# Patient Record
Sex: MALE | Race: WHITE | NOT HISPANIC OR LATINO | Employment: OTHER | URBAN - METROPOLITAN AREA
[De-identification: names, ages, dates, MRNs, and addresses within clinical notes are randomized per-mention and may not be internally consistent; named-entity substitution may affect disease eponyms.]

---

## 2017-01-05 ENCOUNTER — OFFICE VISIT (OUTPATIENT)
Dept: MEDICAL GROUP | Facility: PHYSICIAN GROUP | Age: 67
End: 2017-01-05
Payer: MEDICARE

## 2017-01-05 VITALS
HEART RATE: 62 BPM | SYSTOLIC BLOOD PRESSURE: 192 MMHG | WEIGHT: 194 LBS | BODY MASS INDEX: 24.9 KG/M2 | TEMPERATURE: 97.9 F | RESPIRATION RATE: 16 BRPM | OXYGEN SATURATION: 97 % | DIASTOLIC BLOOD PRESSURE: 90 MMHG | HEIGHT: 74 IN

## 2017-01-05 DIAGNOSIS — N18.9 CHRONIC KIDNEY DISEASE, UNSPECIFIED STAGE: ICD-10-CM

## 2017-01-05 DIAGNOSIS — Z23 NEED FOR PNEUMOCOCCAL VACCINATION: ICD-10-CM

## 2017-01-05 DIAGNOSIS — R35.0 URINARY FREQUENCY: ICD-10-CM

## 2017-01-05 DIAGNOSIS — I25.10 CORONARY ARTERY DISEASE DUE TO LIPID RICH PLAQUE: ICD-10-CM

## 2017-01-05 DIAGNOSIS — I25.83 CORONARY ARTERY DISEASE DUE TO LIPID RICH PLAQUE: ICD-10-CM

## 2017-01-05 DIAGNOSIS — E78.5 DYSLIPIDEMIA: ICD-10-CM

## 2017-01-05 DIAGNOSIS — Z95.1 S/P CABG X 3: ICD-10-CM

## 2017-01-05 DIAGNOSIS — Z23 NEED FOR INFLUENZA VACCINATION: ICD-10-CM

## 2017-01-05 DIAGNOSIS — I25.84 CORONARY ARTERY DISEASE DUE TO CALCIFIED CORONARY LESION: ICD-10-CM

## 2017-01-05 DIAGNOSIS — I10 ESSENTIAL HYPERTENSION, BENIGN: ICD-10-CM

## 2017-01-05 DIAGNOSIS — Z00.00 MEDICARE ANNUAL WELLNESS VISIT, INITIAL: ICD-10-CM

## 2017-01-05 DIAGNOSIS — I10 BENIGN ESSENTIAL HTN: ICD-10-CM

## 2017-01-05 DIAGNOSIS — I25.10 CORONARY ARTERY DISEASE DUE TO CALCIFIED CORONARY LESION: ICD-10-CM

## 2017-01-05 DIAGNOSIS — Z23 NEED FOR ZOSTER VACCINATION: ICD-10-CM

## 2017-01-05 PROCEDURE — G0439 PPPS, SUBSEQ VISIT: HCPCS | Mod: 25 | Performed by: FAMILY MEDICINE

## 2017-01-05 PROCEDURE — 90670 PCV13 VACCINE IM: CPT | Performed by: FAMILY MEDICINE

## 2017-01-05 PROCEDURE — G0009 ADMIN PNEUMOCOCCAL VACCINE: HCPCS | Performed by: FAMILY MEDICINE

## 2017-01-05 PROCEDURE — G0008 ADMIN INFLUENZA VIRUS VAC: HCPCS | Performed by: FAMILY MEDICINE

## 2017-01-05 PROCEDURE — 90662 IIV NO PRSV INCREASED AG IM: CPT | Performed by: FAMILY MEDICINE

## 2017-01-05 PROCEDURE — 99214 OFFICE O/P EST MOD 30 MIN: CPT | Mod: 25 | Performed by: FAMILY MEDICINE

## 2017-01-05 RX ORDER — LISINOPRIL 20 MG/1
20 TABLET ORAL DAILY
Qty: 90 TAB | Refills: 3 | Status: SHIPPED | OUTPATIENT
Start: 2017-01-05 | End: 2017-01-18 | Stop reason: SDUPTHER

## 2017-01-05 ASSESSMENT — ENCOUNTER SYMPTOMS
MUSCULOSKELETAL NEGATIVE: 1
ARTHRALGIAS: 0
FEVER: 0
MYALGIAS: 0
HEADACHES: 0
CONSTIPATION: 0
CONSTITUTIONAL NEGATIVE: 1
NECK PAIN: 0
CHILLS: 0
HEMOPTYSIS: 0
COUGH: 0
NEUROLOGICAL NEGATIVE: 1
CHANGE IN BOWEL HABIT: 0
PSYCHIATRIC NEGATIVE: 1
EYES NEGATIVE: 1
GASTROINTESTINAL NEGATIVE: 1
PALPITATIONS: 0
ANOREXIA: 0
ABDOMINAL PAIN: 0
CARDIOVASCULAR NEGATIVE: 1
DIZZINESS: 0
RESPIRATORY NEGATIVE: 1

## 2017-01-05 NOTE — PROGRESS NOTES
Subjective:      Deniz Julio is a 67 y.o. male who presents with Annual Exam            HPI Comments: Seen in  for urinary freq. No other issues with urine, on exam normal urine test seen    1. Medicare annual wellness visit, initial      2. S/P CABG x 3      3. Coronary artery disease due to lipid rich plaque      4. Dyslipidemia      5. Chronic kidney disease, unspecified stage      6. Urinary frequency      Past Medical History:    Hypertension                                                  CAD (coronary artery disease), native coronary*               DVT (deep venous thrombosis) ([facility])                              Comment:had dvt after harvest of thigh vein, goes to                coumadin clini    Obesity                                                       HEYDI (obstructive sleep apnea)                                 Chronic kidney disease                                      Past Surgical History:    MULTIPLE CORONARY ARTERY BYPASS ENDO VEIN HARV*  2/16/2015       Comment:Performed by Ino Culver M.D. at SURGERY                [facility] ORS    TONSILLECTOMY                                                  Smoking Status: Former Smoker                   Packs/Day: 0.00  Years:            Types: Cigarettes    Smokeless Status: Never Used                        Alcohol Use: Yes           0.0 oz/week       0 Standard drinks or equivalent per week       Comment: Wine occ    Review of patient's family history indicates:    Heart Disease                  Mother                    Heart Disease                  Father                    Other                          Father                      Comment: GI Bleeding      Current outpatient prescriptions: •  atorvastatin (LIPITOR) 20 MG Tab, Take 1 Tab by mouth every day., Disp: 30 Tab, Rfl: 11•  carvedilol (COREG) 6.25 MG Tab, Take 1 Tab by mouth 2 times a day, with meals., Disp: 180 Tab, Rfl: 4•  lisinopril (PRINIVIL) 10 MG Tab, Take 1 Tab by mouth every  day., Disp: 90 Tab, Rfl: 3•  aspirin 81 MG EC tablet, Take 1 Tab by mouth every day., Disp: 30 Tab, Rfl: •  lisinopril (PRINIVIL) 10 MG Tab, TAKE ONE TABLET BY MOUTH TWICE A DAY, Disp: 180 Tab, Rfl: 2•  cyanocobalamin (VITAMIN B12) 1000 MCG TABS, Take 1 Tab by mouth every day., Disp: 30 Tab, Rfl: 0    Assessment/plan: diagnoses listed as above in problem list. Patient will continue with current medications/diet/lifestyle modifications    Patient will continue with current medication regimen, advised on taking meds every day, f/u in 3 mo.          Urinary Frequency  This is a new problem. The current episode started 1 to 4 weeks ago. The problem occurs intermittently. The problem has been waxing and waning. Pertinent negatives include no abdominal pain, anorexia, arthralgias, change in bowel habit, chest pain, chills, coughing, fever, headaches, myalgias, neck pain or rash. Nothing aggravates the symptoms. He has tried nothing for the symptoms. The treatment provided no relief.       Review of Systems   Constitutional: Negative.  Negative for fever and chills.        Past Medical History:    Hypertension                                                  CAD (coronary artery disease), native coronary*               DVT (deep venous thrombosis) ([facility])                              Comment:had dvt after harvest of thigh vein, goes to                coumadin clini    Obesity                                                       HEYDI (obstructive sleep apnea)                                 Chronic kidney disease                                      Past Surgical History:    MULTIPLE CORONARY ARTERY BYPASS ENDO VEIN HARV*  2/16/2015       Comment:Performed by Ino Culver M.D. at SURGERY                [facility]    TONSILLECTOMY                                                  Smoking Status: Former Smoker                   Packs/Day: 0.00  Years:           Types: Cigarettes    Smokeless Status: Never Used             "            Alcohol Use: Yes           0.0 oz/week       0 Standard drinks or equivalent per week       Comment: Wine occ    Review of patient's family history indicates:    Heart Disease                  Mother                    Heart Disease                  Father                    Other                          Father                      Comment: GI Bleeding     HENT: Negative.    Eyes: Negative.    Respiratory: Negative.  Negative for cough and hemoptysis.    Cardiovascular: Negative.  Negative for chest pain and palpitations.   Gastrointestinal: Negative.  Negative for abdominal pain, constipation, anorexia and change in bowel habit.   Genitourinary: Positive for frequency. Negative for dysuria and urgency.   Musculoskeletal: Negative.  Negative for myalgias, arthralgias and neck pain.   Skin: Negative.  Negative for rash.   Neurological: Negative.  Negative for dizziness and headaches.   Endo/Heme/Allergies: Negative.    Psychiatric/Behavioral: Negative.  Negative for suicidal ideas.          Objective:     /90 mmHg  Pulse 62  Temp(Src) 36.6 °C (97.9 °F)  Resp 16  Ht 1.867 m (6' 1.5\")  Wt 87.998 kg (194 lb)  BMI 25.25 kg/m2  SpO2 97%     Physical Exam   Constitutional: He is oriented to person, place, and time. No distress.   HENT:   Head: Normocephalic and atraumatic.   Right Ear: External ear normal.   Left Ear: External ear normal.   Nose: Nose normal.   Mouth/Throat: Oropharynx is clear and moist. No oropharyngeal exudate.   Eyes: Pupils are equal, round, and reactive to light. Right eye exhibits no discharge. Left eye exhibits no discharge. No scleral icterus.   Neck: Normal range of motion. Neck supple. No JVD present. No tracheal deviation present. No thyromegaly present.   Cardiovascular: Normal rate, regular rhythm, normal heart sounds and intact distal pulses.  Exam reveals no gallop and no friction rub.    No murmur heard.  Pulmonary/Chest: Effort normal and breath sounds normal. No " stridor. No respiratory distress. He has no wheezes. He has no rales. He exhibits no tenderness.   Abdominal: Soft. He exhibits no distension. There is no tenderness.   Lymphadenopathy:     He has no cervical adenopathy.   Neurological: He is alert and oriented to person, place, and time.   Skin: Skin is warm and dry. He is not diaphoretic.   Psychiatric: Judgment normal.   Nursing note and vitals reviewed.              Assessment/Plan:       Chief Complaint   Patient presents with   • Annual Exam   :  Gait assists - none  Ostomies- none  Amputations - none  Oxygen usage - none  Dentures - n  Hearing aides -n  gu - n  Advanced directive - n      No problem-specific assessment & plan notes found for this encounter.        Health Maintenance Due   Topic Date Due   • IMM DTaP/Tdap/Td Vaccine (1 - Tdap) 01/05/1967   • IMM ZOSTER VACCINE  01/05/2008   • IMM PNEUMOCOCCAL 65+ (ADULT) LOW/MEDIUM RISK SERIES (2 of 2 - PCV13) 02/06/2016   • IMM INFLUENZA (1) 09/01/2016               HM ordered today - influ and pna ordered, ordered zoster      Depression Screening    Little interest or pleasure in doing things?  0           Feeling down, depressed , or hopeless?  0        Score of first two questions:   0      Screening for Cognitive Impairment    Three Minute Recall (banana, sunrise, fence)  3/3    Draw clock face with all 12 numbers set to the hand to show 10 minures past 11 o'clock Negative, no defect noted     Cognitive concerns identified defferred for follow up unless specifically addressed in assesment and plan.    Timed Up and Go Test:    FALL RISK ASSESSMENT    Up an Go Test score:    8    A time of over 14 seconds identifies patient as potential fall risk.           Safety Assessment    Throw rugs on floor.   Yes    Handrails on all stairs.   Yes    Good lighting in all hallways.   Yes    Difficulty hearing.  No     Patient counseled about all safety risks that were identified.      Functional Assessment ADLs    Are  "there any barriers preventing you from cooking for yourself or meeting nutritional needs?  No     Are there any barriers preventing you from driving safely or obtaining transportation?  No     Are there any barriers preventing you from using a telephone or calling for help?   No      Are there any barriers preventing you from shopping?   No    Are there any barriers preventing you from taking care of your own finances?     No    Are there any barriers preventing you from managing your medications?    No    Are currently engaing any exercise or physical activity?    Yes    Current Health Problems:    No problem-specific assessment & plan notes found for this encounter.      Past medical history, past surgical history, allergies, medications and social history were reviewed with the patient at today's visit and updated per their chart.    Exam:  Blood pressure 192/90, pulse 62, temperature 36.6 °C (97.9 °F), resp. rate 16, height 1.867 m (6' 1.5\"), weight 87.998 kg (194 lb), SpO2 97 %.  General:  Well nourished, well developed male in NAD  Head is grossly normal.  Neck: Supple without JVD or bruit. Thyroid is not enlarged.  Pulmonary: Clear to ausculation and percussion.  Normal effort. No rales, ronchi, or wheezing.  Cardiovascular: Regular rate and rhythm without murmur. Carotid and radial pulses are intact and equal bilaterally.  Extremities: no clubbing, cyanosis, or edema.        Assessment and Plan:  1. Medicare annual wellness visit, initial      2. S/P CABG x 3  controlled      3. Coronary artery disease due to lipid rich plaque  controlled      4. Dyslipidemia  Currently treated for HLD, taking meds with no new myalgias or joint pain, watching fats in diet  controlled        5. Chronic kidney disease, unspecified stage  Patient is currently being followed for chronic renal insufficiency. They are avoiding nsaids and maintaining hydration and following renal diet. Taking all appropriate meds. No new change in " urine frequency or volume.      6. Urinary frequency  Resolved with no treatment will monitor    7. htn  No optimal will double up lisinopril to 20 mg    Ordered vaccines today

## 2017-01-05 NOTE — MR AVS SNAPSHOT
"        Deniz Reedt   2017 10:45 AM   Office Visit   MRN: [id number]    Department:  [facility]    Dept Phone:  [phone]    Description:  Male : 1950   Provider:  Joshua Fu M.D.           Reason for Visit     Annual Exam           Allergies as of 2017     Allergen Noted Reactions    Tetracycline 2015       Unknown rxn.    Tripelennamine 2015       Unknown rxn.      You were diagnosed with     Medicare annual wellness visit, initial   [506762]       S/P CABG x 3   [428063]       Coronary artery disease due to lipid rich plaque   [4837789]       Dyslipidemia   [873989]       Chronic kidney disease, unspecified stage   [5691883]       Urinary frequency   [788.41.ICD-9-CM]       Benign essential HTN   [729976]       Need for pneumococcal vaccination   [571152]       Need for influenza vaccination   [125808]       Need for zoster vaccination   [089198]       Coronary artery disease due to calcified coronary lesion   [0512544]       Essential hypertension, benign   [401.1.ICD-9-CM]         Vital Signs     Blood Pressure Pulse Temperature Respirations Height Weight    192/90 mmHg 62 36.6 °C (97.9 °F) 16 1.867 m (6' 1.5\") 87.998 kg (194 lb)    Body Mass Index Oxygen Saturation Smoking Status             25.25 kg/m2 97% Former Smoker         Basic Information     Date Of Birth Sex Race Ethnicity Preferred Language    1950 Male White Non- English      Your appointments     May 30, 2017 10:00 AM   FOLLOW UP with Brent Becker M.D.   [facility] (--)    [address], [address]  [facility] [address]   [phone]              Problem List              ICD-10-CM Priority Class Noted - Resolved    S/P CABG x 3 Z95.1 High Acute 2015 - Present    CAD (coronary artery disease) I25.10 High Acute 2015 - Present    Dyslipidemia E78.5 Low Acute 2015 - Present    Urinary frequency R35.0 Medium Acute 2015 - Present   " Chronic kidney disease N18.9 Low Acute 2/23/2015 - Present    Obstructive sleep apnea G47.33   8/22/2016 - Present    Benign essential HTN I10   1/5/2017 - Present      Health Maintenance        Date Due Completion Dates    IMM DTaP/Tdap/Td Vaccine (1 - Tdap) 1/5/1967 ---    IMM ZOSTER VACCINE 1/5/2008 ---    IMM PNEUMOCOCCAL 65+ (ADULT) LOW/MEDIUM RISK SERIES (2 of 2 - PCV13) 2/6/2016 2/6/2015    IMM INFLUENZA (1) 9/1/2016 ---    COLONOSCOPY 3/13/2025 3/13/2015 (Declined)    Override on 3/13/2015: Patient Declined            Current Immunizations     13-VALENT PCV PREVNAR 1/5/2017    Influenza Vaccine Adult HD 1/5/2017    Pneumococcal polysaccharide vaccine (PPSV-23) 2/6/2015  2:14 PM      Below and/or attached are the medications your provider expects you to take. Review all of your home medications and newly ordered medications with your provider and/or pharmacist. Follow medication instructions as directed by your provider and/or pharmacist. Please keep your medication list with you and share with your provider. Update the information when medications are discontinued, doses are changed, or new medications (including over-the-counter products) are added; and carry medication information at all times in the event of emergency situations     Allergies:  TETRACYCLINE - (reactions not documented)     TRIPELENNAMINE - (reactions not documented)               Medications  Valid as of: January 05, 2017 - 11:04 AM    Generic Name Brand Name Tablet Size Instructions for use    Aspirin (Tablet Delayed Response) aspirin 81 MG Take 1 Tab by mouth every day.        Atorvastatin Calcium (Tab) LIPITOR 20 MG Take 1 Tab by mouth every day.        Carvedilol (Tab) COREG 6.25 MG Take 1 Tab by mouth 2 times a day, with meals.        Cyanocobalamin (Tab) VITAMIN B12 1000 MCG Take 1 Tab by mouth every day.        Lisinopril (Tab) PRINIVIL 20 MG Take 1 Tab by mouth every day.        Zoster Vaccine Live (Recon Soln) ZOSTAVAX 09718  UNT/0.65ML Inject 0.65 mL as instructed Once for 1 dose.        .                 Medicines prescribed today were sent to:     [id number] [place] [place] - [address]    [address] [place] [address]    Phone: [phone] Fax: [phone]    Open 24 Hours?: No      Medication refill instructions:       If your prescription bottle indicates you have medication refills left, it is not necessary to call your provider’s office. Please contact your pharmacy and they will refill your medication.    If your prescription bottle indicates you do not have any refills left, you may request refills at any time through one of the following ways: The online [employer] system (except Urgent Care), by calling your provider’s office, or by asking your pharmacy to contact your provider’s office with a refill request. Medication refills are processed only during regular business hours and may not be available until the next business day. Your provider may request additional information or to have a follow-up visit with you prior to refilling your medication.   *Please Note: Medication refills are assigned a new Rx number when refilled electronically. Your pharmacy may indicate that no refills were authorized even though a new prescription for the same medication is available at the pharmacy. Please request the medicine by name with the pharmacy before contacting your provider for a refill.        Other Notes About Your Plan     DME. [facility] [phone] Fx. [phone]  Now switched to  DME:  [facility] / ph [phone] / fx [phone]               MyChart Status: Patient Declined

## 2017-01-18 DIAGNOSIS — I25.111 CORONARY ARTERY DISEASE INVOLVING NATIVE CORONARY ARTERY OF NATIVE HEART WITH ANGINA PECTORIS WITH DOCUMENTED SPASM (HCC): ICD-10-CM

## 2017-01-18 DIAGNOSIS — I10 ESSENTIAL HYPERTENSION, BENIGN: ICD-10-CM

## 2017-01-18 DIAGNOSIS — I10 BENIGN ESSENTIAL HTN: ICD-10-CM

## 2017-01-18 DIAGNOSIS — I25.83 CORONARY ARTERY DISEASE DUE TO LIPID RICH PLAQUE: ICD-10-CM

## 2017-01-18 DIAGNOSIS — I25.84 CORONARY ARTERY DISEASE DUE TO CALCIFIED CORONARY LESION: ICD-10-CM

## 2017-01-18 DIAGNOSIS — E78.5 DYSLIPIDEMIA: ICD-10-CM

## 2017-01-18 DIAGNOSIS — I25.10 CORONARY ARTERY DISEASE DUE TO CALCIFIED CORONARY LESION: ICD-10-CM

## 2017-01-18 DIAGNOSIS — I25.10 CORONARY ARTERY DISEASE DUE TO LIPID RICH PLAQUE: ICD-10-CM

## 2017-01-18 NOTE — TELEPHONE ENCOUNTER
Was the patient seen in the last year in this department? Yes     Does patient have an active prescription for medications requested? No     Received Request Via: Patient       Last office visit 01/05/17  Last labs 11/09/16

## 2017-01-19 RX ORDER — ATORVASTATIN CALCIUM 40 MG/1
40 TABLET, FILM COATED ORAL DAILY
Qty: 90 TAB | Refills: 0 | Status: SHIPPED | OUTPATIENT
Start: 2017-01-19 | End: 2017-05-30 | Stop reason: SDUPTHER

## 2017-01-19 RX ORDER — LISINOPRIL 20 MG/1
20 TABLET ORAL DAILY
Qty: 90 TAB | Refills: 0 | Status: SHIPPED | OUTPATIENT
Start: 2017-01-19 | End: 2017-05-30 | Stop reason: SDUPTHER

## 2017-01-19 RX ORDER — CARVEDILOL 6.25 MG/1
6.25 TABLET ORAL 2 TIMES DAILY WITH MEALS
Qty: 180 TAB | Refills: 0 | Status: SHIPPED | OUTPATIENT
Start: 2017-01-19 | End: 2017-05-30 | Stop reason: SDUPTHER

## 2017-03-28 ENCOUNTER — ANTICOAGULATION MONITORING (OUTPATIENT)
Dept: VASCULAR LAB | Facility: MEDICAL CENTER | Age: 67
End: 2017-03-28

## 2017-03-28 NOTE — PROGRESS NOTES
Late Entry:    Anticoagulation discontinued. Discharged from Anticoagulation Program.    Barrington Mendez, PHARMD

## 2017-05-30 ENCOUNTER — OFFICE VISIT (OUTPATIENT)
Dept: CARDIOLOGY | Facility: MEDICAL CENTER | Age: 67
End: 2017-05-30
Payer: MEDICARE

## 2017-05-30 VITALS
DIASTOLIC BLOOD PRESSURE: 78 MMHG | OXYGEN SATURATION: 99 % | WEIGHT: 191 LBS | SYSTOLIC BLOOD PRESSURE: 126 MMHG | BODY MASS INDEX: 24.51 KG/M2 | HEIGHT: 74 IN | HEART RATE: 74 BPM

## 2017-05-30 DIAGNOSIS — E78.5 DYSLIPIDEMIA: ICD-10-CM

## 2017-05-30 DIAGNOSIS — I10 BENIGN ESSENTIAL HTN: ICD-10-CM

## 2017-05-30 DIAGNOSIS — Z95.1 S/P CABG X 3: ICD-10-CM

## 2017-05-30 DIAGNOSIS — I25.111 CORONARY ARTERY DISEASE INVOLVING NATIVE CORONARY ARTERY OF NATIVE HEART WITH ANGINA PECTORIS WITH DOCUMENTED SPASM (HCC): ICD-10-CM

## 2017-05-30 DIAGNOSIS — I10 ESSENTIAL HYPERTENSION, BENIGN: ICD-10-CM

## 2017-05-30 PROCEDURE — G8432 DEP SCR NOT DOC, RNG: HCPCS | Performed by: INTERNAL MEDICINE

## 2017-05-30 PROCEDURE — 1101F PT FALLS ASSESS-DOCD LE1/YR: CPT | Performed by: INTERNAL MEDICINE

## 2017-05-30 PROCEDURE — G8599 NO ASA/ANTIPLAT THER USE RNG: HCPCS | Performed by: INTERNAL MEDICINE

## 2017-05-30 PROCEDURE — G8420 CALC BMI NORM PARAMETERS: HCPCS | Performed by: INTERNAL MEDICINE

## 2017-05-30 PROCEDURE — 1036F TOBACCO NON-USER: CPT | Performed by: INTERNAL MEDICINE

## 2017-05-30 PROCEDURE — 4040F PNEUMOC VAC/ADMIN/RCVD: CPT | Performed by: INTERNAL MEDICINE

## 2017-05-30 PROCEDURE — 3017F COLORECTAL CA SCREEN DOC REV: CPT | Mod: 8P | Performed by: INTERNAL MEDICINE

## 2017-05-30 PROCEDURE — 99213 OFFICE O/P EST LOW 20 MIN: CPT | Performed by: INTERNAL MEDICINE

## 2017-05-30 RX ORDER — CARVEDILOL 6.25 MG/1
6.25 TABLET ORAL 2 TIMES DAILY WITH MEALS
Qty: 180 TAB | Refills: 3 | Status: SHIPPED | OUTPATIENT
Start: 2017-05-30 | End: 2018-03-14 | Stop reason: SDUPTHER

## 2017-05-30 RX ORDER — ATORVASTATIN CALCIUM 40 MG/1
40 TABLET, FILM COATED ORAL DAILY
Qty: 90 TAB | Refills: 3 | Status: SHIPPED | OUTPATIENT
Start: 2017-05-30 | End: 2018-03-14 | Stop reason: SDUPTHER

## 2017-05-30 RX ORDER — LISINOPRIL 10 MG/1
10 TABLET ORAL 2 TIMES DAILY
Qty: 180 TAB | Refills: 3 | Status: SHIPPED | OUTPATIENT
Start: 2017-05-30 | End: 2018-03-14 | Stop reason: SDUPTHER

## 2017-05-30 NOTE — MR AVS SNAPSHOT
"        Deniz Reedt   2017 10:15 AM   Office Visit   MRN: [id number]    Department:  [facility]   Dept Phone:  [phone]    Description:  Male : 1950   Provider:  Brent Becker M.D.           Reason for Visit     Follow-Up           Allergies as of 2017     Allergen Noted Reactions    Tetracycline 2015       Unknown rxn.    Tripelennamine 2015       Unknown rxn.      You were diagnosed with     Coronary artery disease involving native coronary artery of native heart with angina pectoris with documented spasm (CMS[facility])   [3188674]       Essential hypertension, benign   [401.1.ICD-9-CM]       Benign essential HTN   [637220]       Dyslipidemia   [669872]       S/P CABG x 3   [807655]         Vital Signs     Blood Pressure Pulse Height Weight Body Mass Index Oxygen Saturation    126/78 mmHg 74 1.867 m (6' 1.5\") 86.637 kg (191 lb) 24.86 kg/m2 99%    Smoking Status                   Former Smoker           Basic Information     Date Of Birth Sex Race Ethnicity Preferred Language    1950 Male White Non- English      Your appointments     May 30, 2017 10:15 AM   FOLLOW UP with Brent Becker M.D.   [facility] (--)    [address], [address]  [place] [id number] [phone]            Aug 23, 2017 10:00 AM   Follow UP with ED Stoddard   [facility] (--)    [address]  [place] [address]   [phone]            Franklyn 10, 2018 10:30 AM   FOLLOW UP with Brent Becker M.D.   [facility] (--)    [address], [address]  [place] [id number] [phone]              Problem List              ICD-10-CM Priority Class Noted - Resolved    S/P CABG x 3 Z95.1 High Acute 2015 - Present    CAD (coronary artery disease) I25.10 High Acute 2015 - Present    Dyslipidemia E78.5 Low Acute 2015 - Present    Urinary frequency " R35.0 Medium Acute 2/23/2015 - Present    Chronic kidney disease N18.9 Low Acute 2/23/2015 - Present    Obstructive sleep apnea G47.33   8/22/2016 - Present    Benign essential HTN I10   1/5/2017 - Present      Health Maintenance        Date Due Completion Dates    IMM DTaP/Tdap/Td Vaccine (1 - Tdap) 1/5/1967 ---    IMM ZOSTER VACCINE 1/5/2008 ---    COLONOSCOPY 3/13/2025 3/13/2015 (Declined)    Override on 3/13/2015: Patient Declined            Current Immunizations     13-VALENT PCV PREVNAR 1/5/2017    Influenza Vaccine Adult HD 1/5/2017    Pneumococcal polysaccharide vaccine (PPSV-23) 2/6/2015  2:14 PM      Below and/or attached are the medications your provider expects you to take. Review all of your home medications and newly ordered medications with your provider and/or pharmacist. Follow medication instructions as directed by your provider and/or pharmacist. Please keep your medication list with you and share with your provider. Update the information when medications are discontinued, doses are changed, or new medications (including over-the-counter products) are added; and carry medication information at all times in the event of emergency situations     Allergies:  TETRACYCLINE - (reactions not documented)     TRIPELENNAMINE - (reactions not documented)               Medications  Valid as of: May 30, 2017 -  9:53 AM    Generic Name Brand Name Tablet Size Instructions for use    Aspirin (Tablet Delayed Response) aspirin 81 MG Take 1 Tab by mouth every day.        Atorvastatin Calcium (Tab) LIPITOR 40 MG Take 1 Tab by mouth every day.        Carvedilol (Tab) COREG 6.25 MG Take 1 Tab by mouth 2 times a day, with meals.        Cyanocobalamin (Tab) VITAMIN B12 1000 MCG Take 1 Tab by mouth every day.        Lisinopril (Tab) PRINIVIL 10 MG Take 1 Tab by mouth 2 times a day.        .                 Medicines prescribed today were sent to:     [facility][id number] - [place] - [address]    [address]  [place] [address]    Phone: [phone] Fax: [phone]    Open 24 Hours?: No      Medication refill instructions:       If your prescription bottle indicates you have medication refills left, it is not necessary to call your provider’s office. Please contact your pharmacy and they will refill your medication.    If your prescription bottle indicates you do not have any refills left, you may request refills at any time through one of the following ways: The online [employer] system (except Urgent Care), by calling your provider’s office, or by asking your pharmacy to contact your provider’s office with a refill request. Medication refills are processed only during regular business hours and may not be available until the next business day. Your provider may request additional information or to have a follow-up visit with you prior to refilling your medication.   *Please Note: Medication refills are assigned a new Rx number when refilled electronically. Your pharmacy may indicate that no refills were authorized even though a new prescription for the same medication is available at the pharmacy. Please request the medicine by name with the pharmacy before contacting your provider for a refill.        Your To Do List     Future Labs/Procedures Complete By Expires    HEPATIC FUNCTION PANEL  As directed 5/30/2018    LIPID PROFILE  As directed 5/30/2018      Other Notes About Your Plan     DME. [facility] [phone] Fx. [phone]  Now switched to  DME:  [facility] / [facility] [phone] / fx [phone]               MyChart Status: Patient Declined

## 2017-05-30 NOTE — PROGRESS NOTES
Subjective:   Deniz Julio is a 67 y.o. male who presents today for followup after newly diagnosed HFrEF (LVEF 40%) and three-vessel CAD status post CABG on 2/16/15 (3 vessels), CKD, postoperative DVT. He is feeling well, NYHA class I, walks 3 miles per day and continues to lose significant amounts of weight due to diet and exercise. His ejection fraction has normalized. Tolerating medical therapy. Blood pressures are in the upper limits of normal now that he is LV function has recovered. His blood pressure is excellent.    Now on CPAP for HEYDI. Has gained a little bit of weight but this appears to be muscle mass as he has been very vigilant in his diet exercise and lifestyle changes.    Denies any other cardiovascular symptoms including chest pain, shortness of breath, dyspnea on exertion, lightheadedness, syncope or presyncope, lower extremity edema, PND, orthopnea or palpitations.      Past Surgical History   Procedure Laterality Date   • Multiple coronary artery bypass endo vein harvest  2/16/2015     Performed by Ino Culver M.D. at SURGERY [facility]   • Tonsillectomy       Family History   Problem Relation Age of Onset   • Heart Disease Mother    • Heart Disease Father    • Other Father      GI Bleeding     History   Smoking status   • Former Smoker   • Types: Cigarettes   Smokeless tobacco   • Never Used     Allergies   Allergen Reactions   • Tetracycline      Unknown rxn.   • Tripelennamine      Unknown rxn.     Outpatient Encounter Prescriptions as of 5/30/2017   Medication Sig Dispense Refill   • lisinopril (PRINIVIL) 10 MG Tab Take 1 Tab by mouth 2 times a day. 180 Tab 3   • carvedilol (COREG) 6.25 MG Tab Take 1 Tab by mouth 2 times a day, with meals. 180 Tab 3   • atorvastatin (LIPITOR) 40 MG Tab Take 1 Tab by mouth every day. 90 Tab 3   • aspirin 81 MG EC tablet Take 1 Tab by mouth every day. 30 Tab    • [DISCONTINUED] atorvastatin (LIPITOR) 40 MG Tab Take 1 Tab by mouth every day. 90 Tab 0   •  "[DISCONTINUED] carvedilol (COREG) 6.25 MG Tab Take 1 Tab by mouth 2 times a day, with meals. 180 Tab 0   • [DISCONTINUED] lisinopril (PRINIVIL) 20 MG Tab Take 1 Tab by mouth every day. 90 Tab 0   • cyanocobalamin (VITAMIN B12) 1000 MCG TABS Take 1 Tab by mouth every day. 30 Tab 0     No facility-administered encounter medications on file as of 5/30/2017.     Review of Systems   All other systems reviewed and are negative.       Objective:   /78 mmHg  Pulse 74  Ht 1.867 m (6' 1.5\")  Wt 86.637 kg (191 lb)  BMI 24.86 kg/m2  SpO2 99%    Physical Exam   Constitutional: He is oriented to person, place, and time. He appears well-developed and well-nourished. No distress.   Continued weight loss. Looking well.   HENT:   Head: Normocephalic and atraumatic.   Mouth/Throat: Oropharynx is clear and moist.   Eyes: Conjunctivae are normal. Pupils are equal, round, and reactive to light. No scleral icterus.   Neck: No JVD present. No tracheal deviation present. No thyromegaly present.   Cardiovascular: Normal rate, regular rhythm, S1 normal, normal heart sounds and intact distal pulses.  PMI is not displaced.  Exam reveals no gallop and no friction rub.    No murmur heard.  Pulses:       Carotid pulses are 2+ on the right side, and 2+ on the left side.       Radial pulses are 2+ on the right side, and 2+ on the left side.        Dorsalis pedis pulses are 2+ on the right side, and 2+ on the left side.        Posterior tibial pulses are 2+ on the right side, and 2+ on the left side.   Pulmonary/Chest: Effort normal and breath sounds normal. He has no wheezes. He has no rales.   Well-healed surgical incision, midline   Abdominal: Soft. Bowel sounds are normal. He exhibits no distension and no pulsatile midline mass. There is no tenderness. There is no guarding.   Musculoskeletal: He exhibits no edema.   Well healing surgical incisions lower extremity   Neurological: He is alert and oriented to person, place, and time. No " cranial nerve deficit.   Skin: Skin is warm and dry. No rash noted. He is not diaphoretic. No erythema.   Psychiatric: He has a normal mood and affect. His behavior is normal. Thought content normal.     ECHO CONCLUSIONS (7/9/2015):  Normal left ventricular chamber size.  Normal left ventricular systolic function.  Left ventricular ejection fraction is 55% to 60%.  Grade II diastolic dysfunction - pseudonormal mitral inflow is   observed.    Compared to prior study of 2-6-2015 -- LVEF is now normal and diastolic   function has improved.      Assessment:     1. CAD s/p CABG with recovered LVEF  carvedilol (COREG) 6.25 MG Tab    LIPID PROFILE    HEPATIC FUNCTION PANEL   2. Essential hypertension, benign  lisinopril (PRINIVIL) 10 MG Tab   3. Benign essential HTN  lisinopril (PRINIVIL) 10 MG Tab   4. Dyslipidemia  atorvastatin (LIPITOR) 40 MG Tab   5. S/P CABG x 3         Medical Decision Making:  Today's Assessment / Status / Plan:       He is doing remarkably well. Tolerating his medical therapy. His LV function has recovered. He has no cardiovascular symptoms or complaints.     He is due for lipid profile and has a goal LDL less than 70. Blood pressure is good. Medications refilled.    Continue medical therapy and diet and exercise otherwise and follow up in 6M

## 2017-08-23 ENCOUNTER — SLEEP CENTER VISIT (OUTPATIENT)
Dept: SLEEP MEDICINE | Facility: MEDICAL CENTER | Age: 67
End: 2017-08-23
Payer: MEDICARE

## 2017-08-23 VITALS
WEIGHT: 196 LBS | OXYGEN SATURATION: 96 % | BODY MASS INDEX: 25.15 KG/M2 | HEIGHT: 74 IN | SYSTOLIC BLOOD PRESSURE: 138 MMHG | DIASTOLIC BLOOD PRESSURE: 82 MMHG | HEART RATE: 50 BPM | RESPIRATION RATE: 16 BRPM

## 2017-08-23 DIAGNOSIS — I10 BENIGN ESSENTIAL HTN: ICD-10-CM

## 2017-08-23 DIAGNOSIS — I25.10 CORONARY ARTERY DISEASE DUE TO LIPID RICH PLAQUE: ICD-10-CM

## 2017-08-23 DIAGNOSIS — G47.33 OBSTRUCTIVE SLEEP APNEA: ICD-10-CM

## 2017-08-23 DIAGNOSIS — Z95.1 S/P CABG X 3: ICD-10-CM

## 2017-08-23 DIAGNOSIS — I25.83 CORONARY ARTERY DISEASE DUE TO LIPID RICH PLAQUE: ICD-10-CM

## 2017-08-23 PROCEDURE — 99213 OFFICE O/P EST LOW 20 MIN: CPT | Performed by: NURSE PRACTITIONER

## 2017-08-23 NOTE — MR AVS SNAPSHOT
"        Deniz Reedt   2017 10:00 AM   Sleep Center Visit   MRN: [id number]    Department:  [facility]   Dept Phone:  [phone]    Description:  Male : 1950   Provider:  ED Stoddard           Reason for Visit     Follow-Up           Allergies as of 2017     Allergen Noted Reactions    Tetracycline 2015       Unknown rxn.    Tripelennamine 2015       Unknown rxn.      You were diagnosed with     Obstructive sleep apnea   [124691]       S/P CABG x 3   [394825]       Coronary artery disease due to lipid rich plaque   [2578226]       Benign essential HTN   [821913]       BMI 25.0-25.9,adult   [457946]         Vital Signs     Blood Pressure Pulse Respirations Height Weight Body Mass Index    138/82 mmHg 50 16 1.867 m (6' 1.5\") 88.905 kg (196 lb) 25.51 kg/m2    Oxygen Saturation Smoking Status                96% Former Smoker          Basic Information     Date Of Birth Sex Race Ethnicity Preferred Language    1950 Male White Non- English      Your appointments     Aug 23, 2017 10:00 AM   Follow UP with ED Stoddard   [facility] (--)    [address]  [place] [id number] [phone]            Franklyn 10, 2018 10:30 AM   FOLLOW UP with Brent Becker M.D.   [facility] (--)    [address], [address] [facility] [address]  [place] [address] [phone]              Problem List              ICD-10-CM Priority Class Noted - Resolved    S/P CABG x 3 Z95.1 High Acute 2015 - Present    CAD (coronary artery disease) I25.10 High Acute 2015 - Present    Dyslipidemia E78.5 Low Acute 2015 - Present    Urinary frequency R35.0 Medium Acute 2015 - Present    Chronic kidney disease N18.9 Low Acute 2015 - Present    Obstructive sleep apnea G47.33   2016 - Present    Benign essential HTN I10   2017 - Present      Health Maintenance        Date Due Completion " Dates    IMM DTaP/Tdap/Td Vaccine (1 - Tdap) 1/5/1967 ---    IMM ZOSTER VACCINE 1/5/2008 ---    IMM INFLUENZA (1) 9/1/2017 1/5/2017    COLONOSCOPY 3/13/2025 3/13/2015 (Declined)    Override on 3/13/2015: Patient Declined            Current Immunizations     13-VALENT PCV PREVNAR 1/5/2017    Influenza Vaccine Adult HD 1/5/2017    Pneumococcal polysaccharide vaccine (PPSV-23) 2/6/2015  2:14 PM      Below and/or attached are the medications your provider expects you to take. Review all of your home medications and newly ordered medications with your provider and/or pharmacist. Follow medication instructions as directed by your provider and/or pharmacist. Please keep your medication list with you and share with your provider. Update the information when medications are discontinued, doses are changed, or new medications (including over-the-counter products) are added; and carry medication information at all times in the event of emergency situations     Allergies:  TETRACYCLINE - (reactions not documented)     TRIPELENNAMINE - (reactions not documented)               Medications  Valid as of: August 23, 2017 -  9:47 AM    Generic Name Brand Name Tablet Size Instructions for use    Aspirin (Tablet Delayed Response) aspirin 81 MG Take 1 Tab by mouth every day.        Atorvastatin Calcium (Tab) LIPITOR 40 MG Take 1 Tab by mouth every day.        Carvedilol (Tab) COREG 6.25 MG Take 1 Tab by mouth 2 times a day, with meals.        Cyanocobalamin (Tab) VITAMIN B12 1000 MCG Take 1 Tab by mouth every day.        Lisinopril (Tab) PRINIVIL 10 MG Take 1 Tab by mouth 2 times a day.        .                 Medicines prescribed today were sent to:     [facility] #[id number] - [place], [place] - [address]    [address] [place] [address]    Phone: [phone] Fax: [phone]    Open 24 Hours?: No      Medication refill instructions:       If your prescription bottle indicates you have medication refills left, it is not necessary to  call your provider’s office. Please contact your pharmacy and they will refill your medication.    If your prescription bottle indicates you do not have any refills left, you may request refills at any time through one of the following ways: The online [employer] system (except Urgent Care), by calling your provider’s office, or by asking your pharmacy to contact your provider’s office with a refill request. Medication refills are processed only during regular business hours and may not be available until the next business day. Your provider may request additional information or to have a follow-up visit with you prior to refilling your medication.   *Please Note: Medication refills are assigned a new Rx number when refilled electronically. Your pharmacy may indicate that no refills were authorized even though a new prescription for the same medication is available at the pharmacy. Please request the medicine by name with the pharmacy before contacting your provider for a refill.        Other Notes About Your Plan     DME. [facility] Ph. [phone] Fx. [phone]  Now switched to  DME:  [facility] / ph [phone] / fx [phone]               MyChart Status: Patient Declined

## 2017-08-23 NOTE — PROGRESS NOTES
Chief Complaint   Patient presents with   • Follow-Up       HPI:  Deniz Julio is a 67 y.o. year old male here today for annual follow-up on HEYDI. Initially referred for sleep apnea evaluation in August 2015.  He has a history of coronary artery disease, status post 3v CABG 02/15, complicated by a postoperative DVT.  PSG indicates severe HEYDI with an AHI of 42.6, minimum oxygen saturation 77%.  Patient was successfully titrated to CPAP 14 cm H2O.  On these settings he achieved REM sleep, AHI was reduced to 3.5, minimum oxygen saturation was 93%. he continued to have poor sleep and began using oxygen bleed in to device. He then underwent titration study and CPAP 15cm indicated reduced AHI with normal oximetry. Compliance card 5/25/17-8/22/17 indicates 83% compliance, avg nightly use of 5hr 54min, AHi 3.0 and minimal mask leaking.  He tolerates mask/pressure well but notes occasionally waking due to mask leaking. He denies morning headaches or EDS. He feels he sleeps better on therapy. He is working out 4x's per week and feels great. He denies any cardiac or respiratory symptoms. No major changes in health over the last year.    ROS: As per HPI and otherwise negative if not stated.    Past Medical History   Diagnosis Date   • Hypertension    • CAD (coronary artery disease), native coronary artery    • DVT (deep venous thrombosis) ([facility])      had dvt after harvest of thigh vein, goes to coumadin clini   • Obesity    • HEYDI (obstructive sleep apnea)    • Chronic kidney disease        Past Surgical History   Procedure Laterality Date   • Multiple coronary artery bypass endo vein harvest  2/16/2015     Performed by Ino Culver M.D. at SURGERY [facility]   • Tonsillectomy         Family History   Problem Relation Age of Onset   • Heart Disease Mother    • Heart Disease Father    • Other Father      GI Bleeding       Social History     Social History   • Marital Status:      Spouse Name: N/A   • Number of  "Children: N/A   • Years of Education: N/A     Occupational History   • Not on file.     Social History Main Topics   • Smoking status: Former Smoker     Types: Cigarettes   • Smokeless tobacco: Never Used   • Alcohol Use: 0.0 oz/week     0 Standard drinks or equivalent per week      Comment: Wine occ   • Drug Use: No   • Sexual Activity:     Partners: Female     Other Topics Concern   • Not on file     Social History Narrative       Allergies as of 08/23/2017 - Sergei as Reviewed 08/23/2017   Allergen Reaction Noted   • Tetracycline  02/05/2015   • Tripelennamine  02/05/2015        @Vital signs for this encounter:  Filed Vitals:    08/23/17 0925   Height: 1.867 m (6' 1.5\")   Weight: 88.905 kg (196 lb)   Weight % change since last entry.: 0 %   BP: 138/82   Pulse: 50   BMI (Calculated): 25.51   Resp: 16   O2 sat % room air: 96 %       Current medications as of today   Current Outpatient Prescriptions   Medication Sig Dispense Refill   • lisinopril (PRINIVIL) 10 MG Tab Take 1 Tab by mouth 2 times a day. 180 Tab 3   • carvedilol (COREG) 6.25 MG Tab Take 1 Tab by mouth 2 times a day, with meals. 180 Tab 3   • atorvastatin (LIPITOR) 40 MG Tab Take 1 Tab by mouth every day. 90 Tab 3   • aspirin 81 MG EC tablet Take 1 Tab by mouth every day. 30 Tab    • cyanocobalamin (VITAMIN B12) 1000 MCG TABS Take 1 Tab by mouth every day. (Patient not taking: Reported on 8/23/2017) 30 Tab 0     No current facility-administered medications for this visit.         Physical Exam:   Gen:           Alert and oriented, No apparent distress. Mood and affect appropriate, normal interaction with examiner.  Eyes:          PERRL, EOM intact, sclere white, conjunctive moist.  Ears:          Not examined.   Hearing:     Grossly intact.  Nose:          Normal, no lesions or deformities.  Dentition:    Good dentition.  Oropharynx:   Tongue normal, posterior pharynx without erythema or exudate.  Mallampati Classification: not examined.  Neck:      "   Supple, trachea midline, no masses.  Respiratory Effort: No intercostal retractions or use of accessory muscles.   Lung Auscultation:      Clear to auscultation bilaterally; no rales, rhonchi or wheezing.  CV:            Regular rate and rhythm. No murmurs, rubs or gallops.  Abd:           Not examined.   Lymphadenopathy: Not examined.2017  Gait and Station: Normal.  Digits and Nails: No clubbing, cyanosis, petechiae, or nodes.   Cranial Nerves: II-XII grossly intact.  Skin:        No rashes, lesions or ulcers noted.               Ext:           No cyanosis or edema.      Assessment:  1. Obstructive sleep apnea     2. S/P CABG x 3     3. Coronary artery disease due to lipid rich plaque     4. Benign essential HTN     5. BMI 25.0-25.9,adult         Immunizations:    Flu:2017  Pneumovax 23:2015  Prevnar 13: 2017    Plan:  1. Continue CPAP 15cm H20 nightly.  2. DME mask/supplies.  3. Discussed sleep hygiene.  4. Follow up in 1 year with compliance card, sooner if needed.

## 2018-02-12 ENCOUNTER — TELEPHONE (OUTPATIENT)
Dept: CARDIOLOGY | Facility: MEDICAL CENTER | Age: 68
End: 2018-02-12

## 2018-02-13 ENCOUNTER — HOSPITAL ENCOUNTER (OUTPATIENT)
Dept: LAB | Facility: MEDICAL CENTER | Age: 68
End: 2018-02-13
Attending: INTERNAL MEDICINE
Payer: MEDICARE

## 2018-02-13 ENCOUNTER — OFFICE VISIT (OUTPATIENT)
Dept: MEDICAL GROUP | Facility: PHYSICIAN GROUP | Age: 68
End: 2018-02-13
Payer: MEDICARE

## 2018-02-13 VITALS
OXYGEN SATURATION: 96 % | WEIGHT: 207 LBS | BODY MASS INDEX: 26.56 KG/M2 | SYSTOLIC BLOOD PRESSURE: 142 MMHG | RESPIRATION RATE: 14 BRPM | HEART RATE: 51 BPM | DIASTOLIC BLOOD PRESSURE: 80 MMHG | TEMPERATURE: 98.7 F | HEIGHT: 74 IN

## 2018-02-13 DIAGNOSIS — N18.1 STAGE 1 CHRONIC KIDNEY DISEASE: ICD-10-CM

## 2018-02-13 DIAGNOSIS — Z23 NEED FOR ZOSTER VACCINATION: ICD-10-CM

## 2018-02-13 DIAGNOSIS — I10 BENIGN ESSENTIAL HTN: ICD-10-CM

## 2018-02-13 DIAGNOSIS — E78.5 DYSLIPIDEMIA: ICD-10-CM

## 2018-02-13 DIAGNOSIS — I25.10 CORONARY ARTERY DISEASE INVOLVING NATIVE CORONARY ARTERY OF NATIVE HEART WITHOUT ANGINA PECTORIS: ICD-10-CM

## 2018-02-13 DIAGNOSIS — I25.111 CORONARY ARTERY DISEASE INVOLVING NATIVE CORONARY ARTERY OF NATIVE HEART WITH ANGINA PECTORIS WITH DOCUMENTED SPASM (HCC): ICD-10-CM

## 2018-02-13 LAB
ALBUMIN SERPL BCP-MCNC: 3.9 G/DL (ref 3.2–4.9)
ALP SERPL-CCNC: 53 U/L (ref 30–99)
ALT SERPL-CCNC: 22 U/L (ref 2–50)
AST SERPL-CCNC: 17 U/L (ref 12–45)
BILIRUB CONJ SERPL-MCNC: 0.2 MG/DL (ref 0.1–0.5)
BILIRUB INDIRECT SERPL-MCNC: 0.7 MG/DL (ref 0–1)
BILIRUB SERPL-MCNC: 0.9 MG/DL (ref 0.1–1.5)
CHOLEST SERPL-MCNC: 158 MG/DL (ref 100–199)
HDLC SERPL-MCNC: 41 MG/DL
LDLC SERPL CALC-MCNC: 88 MG/DL
PROT SERPL-MCNC: 7.1 G/DL (ref 6–8.2)
TRIGL SERPL-MCNC: 144 MG/DL (ref 0–149)

## 2018-02-13 PROCEDURE — 80061 LIPID PANEL: CPT

## 2018-02-13 PROCEDURE — 80076 HEPATIC FUNCTION PANEL: CPT

## 2018-02-13 PROCEDURE — 99214 OFFICE O/P EST MOD 30 MIN: CPT | Performed by: FAMILY MEDICINE

## 2018-02-13 ASSESSMENT — ENCOUNTER SYMPTOMS
NECK PAIN: 0
CHILLS: 0
DIZZINESS: 0
CONSTITUTIONAL NEGATIVE: 1
FEVER: 0
CARDIOVASCULAR NEGATIVE: 1
HEMOPTYSIS: 0
COUGH: 0
MUSCULOSKELETAL NEGATIVE: 1
EYES NEGATIVE: 1
CONSTIPATION: 0
MYALGIAS: 0
PSYCHIATRIC NEGATIVE: 1
HEADACHES: 0
PALPITATIONS: 0
NEUROLOGICAL NEGATIVE: 1
GASTROINTESTINAL NEGATIVE: 1
RESPIRATORY NEGATIVE: 1

## 2018-02-13 ASSESSMENT — PATIENT HEALTH QUESTIONNAIRE - PHQ9: CLINICAL INTERPRETATION OF PHQ2 SCORE: 0

## 2018-02-13 NOTE — PROGRESS NOTES
1. Coronary artery disease involving native coronary artery of native heart without angina pectoris    - COMP METABOLIC PANEL; Future  - LIPID PROFILE; Future  - FREE THYROXINE; Future  - TRIIDOTHYRONINE; Future  - TSH; Future  - VITAMIN D,25 HYDROXY; Future  - CBC WITHOUT DIFFERENTIAL; Future    2. Benign essential HTN    - COMP METABOLIC PANEL; Future  - LIPID PROFILE; Future  - FREE THYROXINE; Future  - TRIIDOTHYRONINE; Future  - TSH; Future  - VITAMIN D,25 HYDROXY; Future  - CBC WITHOUT DIFFERENTIAL; Future    3. Dslipidemia    - COMP METABOLIC PANEL; Future  - LIPID PROFILE; Future  - FREE THYROXINE; Future  - TRIIDOTHYRONINE; Future  - TSH; Future  - VITAMIN D,25 HYDROXY; Future  - CBC WITHOUT DIFFERENTIAL; Future    4. Stage 1 chronic kidney disease      Past Medical History:   Diagnosis Date   • CAD (coronary artery disease), native coronary artery    • Chronic kidney disease    • DVT (deep venous thrombosis) (CMS-HCC)     had dvt after harvest of thigh vein, goes to coumadin clini   • Hypertension    • Obesity    • HEYDI (obstructive sleep apnea)      Past Surgical History:   Procedure Laterality Date   • MULTIPLE CORONARY ARTERY BYPASS ENDO VEIN HARVEST  2/16/2015    Performed by Ino Culver M.D. at SURGERY [facility]   • TONSILLECTOMY       Social History   Substance Use Topics   • Smoking status: Former Smoker     Types: Cigarettes   • Smokeless tobacco: Never Used   • Alcohol use 0.0 oz/week      Comment: Wine occ     Family History   Problem Relation Age of Onset   • Heart Disease Mother    • Heart Disease Father    • Other Father      GI Bleeding         Current Outpatient Prescriptions:   •  lisinopril (PRINIVIL) 10 MG Tab, Take 1 Tab by mouth 2 times a day., Disp: 180 Tab, Rfl: 3  •  carvedilol (COREG) 6.25 MG Tab, Take 1 Tab by mouth 2 times a day, with meals., Disp: 180 Tab, Rfl: 3  •  atorvastatin (LIPITOR) 40 MG Tab, Take 1 Tab by mouth every day., Disp: 90 Tab, Rfl: 3  •  aspirin 81 MG  EC tablet, Take 1 Tab by mouth every day., Disp: 30 Tab, Rfl:   •  cyanocobalamin (VITAMIN B12) 1000 MCG TABS, Take 1 Tab by mouth every day. (Patient not taking: Reported on 8/23/2017), Disp: 30 Tab, Rfl: 0    Patient was instructed on the use of medications, either prescriptions or OTC and informed on when the appropriate follow up time period should be. In addition, patient was also instructed that should any acute worsening occur that they should notify this clinic asap or call 911.    Subjective:      Deniz Julio is a 68 y.o. male who presents with Hypertension            1. Coronary artery disease involving native coronary artery of native heart without angina pectoris  Needs new labs  On statin and asa and beta blocker  - COMP METABOLIC PANEL; Future  - LIPID PROFILE; Future  - FREE THYROXINE; Future  - TRIIDOTHYRONINE; Future  - TSH; Future  - VITAMIN D,25 HYDROXY; Future  - CBC WITHOUT DIFFERENTIAL; Future    2. Benign essential HTN  Currently treated for HTN, taking meds with no CP or sob, monitors bp at home periodically. controlled    - COMP METABOLIC PANEL; Future  - LIPID PROFILE; Future  - FREE THYROXINE; Future  - TRIIDOTHYRONINE; Future  - TSH; Future  - VITAMIN D,25 HYDROXY; Future  - CBC WITHOUT DIFFERENTIAL; Future    3. Dyslipidemia  Currently treated for HLD, taking meds with no new myalgias or joint pain, watching fats in diet  controlled    - COMP METABOLIC PANEL; Future  - LIPID PROFILE; Future  - FREE THYROXINE; Future  - TRIIDOTHYRONINE; Future  - TSH; Future  - VITAMIN D,25 HYDROXY; Future  - CBC WITHOUT DIFFERENTIAL; Future    4. Stage 1 chronic kidney disease  Patient is currently being followed for chronic renal insufficiency. They are avoiding nsaids and maintaining hydration and following renal diet. Taking all appropriate meds. No new change in urine frequency or volume.      Past Medical History:  No date: CAD (coronary artery disease), native coronary*  No date: Chronic kidney  disease  No date: DVT (deep venous thrombosis) (CMS-[facility])      Comment: had dvt after harvest of thigh vein, goes to                coumadin clini  No date: Hypertension  No date: Obesity  No date: HEYDI (obstructive sleep apnea)  Past Surgical History:  2/16/2015: MULTIPLE CORONARY ARTERY BYPASS ENDO VEIN HARV*      Comment: Performed by Ino Culver M.D. at SURGERY                [facility] ORS  No date: TONSILLECTOMY  Smoking status: Former Smoker                                                              Packs/day: 0.00      Years: 0.00         Types: Cigarettes  Smokeless tobacco: Never Used                      Alcohol use: Yes           0.0 oz/week     Comment: Wine occ    Review of patient's family history indicates:    Heart Disease                  Mother                    Heart Disease                  Father                    Other                          Father                      Comment: GI Bleeding      Current Outpatient Prescriptions: •  lisinopril (PRINIVIL) 10 MG Tab, Take 1 Tab by mouth 2 times a day., Disp: 180 Tab, Rfl: 3•  carvedilol (COREG) 6.25 MG Tab, Take 1 Tab by mouth 2 times a day, with meals., Disp: 180 Tab, Rfl: 3•  atorvastatin (LIPITOR) 40 MG Tab, Take 1 Tab by mouth every day., Disp: 90 Tab, Rfl: 3•  aspirin 81 MG EC tablet, Take 1 Tab by mouth every day., Disp: 30 Tab, Rfl: •  cyanocobalamin (VITAMIN B12) 1000 MCG TABS, Take 1 Tab by mouth every day. (Patient not taking: Reported on 8/23/2017), Disp: 30 Tab, Rfl: 0    Patient was instructed on the use of medications, either prescriptions or OTC and informed on when the appropriate follow up time period should be. In addition, patient was also instructed that should any acute worsening occur that they should notify this clinic asap or call 911.            Review of Systems   Constitutional: Negative.  Negative for chills and fever.        Past Medical History:  No date: CAD (coronary artery disease), native coronary*  No  "date: Chronic kidney disease  No date: DVT (deep venous thrombosis) (CMS-[facility])      Comment: had dvt after harvest of thigh vein, goes to                coumadin clini  No date: Hypertension  No date: Obesity  No date: HEYDI (obstructive sleep apnea)  Past Surgical History:  2/16/2015: MULTIPLE CORONARY ARTERY BYPASS ENDO VEIN HARV*      Comment: Performed by Ino Culver M.D. at SURGERY                [facility]  No date: TONSILLECTOMY  Smoking status: Former Smoker                                                              Packs/day: 0.00      Years: 0.00         Types: Cigarettes  Smokeless tobacco: Never Used                      Alcohol use: Yes           0.0 oz/week     Comment: Wine occ    Review of patient's family history indicates:    Heart Disease                  Mother                    Heart Disease                  Father                    Other                          Father                      Comment: GI Bleeding     HENT: Negative.    Eyes: Negative.    Respiratory: Negative.  Negative for cough and hemoptysis.    Cardiovascular: Negative.  Negative for chest pain and palpitations.   Gastrointestinal: Negative.  Negative for constipation.   Genitourinary: Negative.  Negative for dysuria and urgency.   Musculoskeletal: Negative.  Negative for myalgias and neck pain.   Skin: Negative.  Negative for rash.   Neurological: Negative.  Negative for dizziness and headaches.   Endo/Heme/Allergies: Negative.    Psychiatric/Behavioral: Negative.  Negative for suicidal ideas.          Objective:     /80   Pulse (!) 51   Temp 37.1 °C (98.7 °F)   Resp 14   Ht 1.867 m (6' 1.5\")   Wt 93.9 kg (207 lb)   SpO2 96%   BMI 26.94 kg/m²      Physical Exam   Constitutional: He is oriented to person, place, and time. He appears well-developed and well-nourished. No distress.   HENT:   Head: Normocephalic and atraumatic.   Mouth/Throat: Oropharynx is clear and moist. No oropharyngeal exudate.   Eyes: " Pupils are equal, round, and reactive to light.   Cardiovascular: Normal rate, regular rhythm, normal heart sounds and intact distal pulses.  Exam reveals no gallop and no friction rub.    No murmur heard.  Pulmonary/Chest: Effort normal and breath sounds normal. No respiratory distress. He has no wheezes. He has no rales. He exhibits no tenderness.   Neurological: He is alert and oriented to person, place, and time.   Skin: He is not diaphoretic.   Psychiatric: He has a normal mood and affect. His behavior is normal. Judgment and thought content normal.   Nursing note and vitals reviewed.              Assessment/Plan:     1. Coronary artery disease involving native coronary artery of native heart without angina pectoris    - COMP METABOLIC PANEL; Future  - LIPID PROFILE; Future  - FREE THYROXINE; Future  - TRIIDOTHYRONINE; Future  - TSH; Future  - VITAMIN D,25 HYDROXY; Future  - CBC WITHOUT DIFFERENTIAL; Future    2. Benign essential HTN    - COMP METABOLIC PANEL; Future  - LIPID PROFILE; Future  - FREE THYROXINE; Future  - TRIIDOTHYRONINE; Future  - TSH; Future  - VITAMIN D,25 HYDROXY; Future  - CBC WITHOUT DIFFERENTIAL; Future    3. Dyslipidemia    - COMP METABOLIC PANEL; Future  - LIPID PROFILE; Future  - FREE THYROXINE; Future  - TRIIDOTHYRONINE; Future  - TSH; Future  - VITAMIN D,25 HYDROXY; Future  - CBC WITHOUT DIFFERENTIAL; Future    4. Stage 1 chronic kidney disease

## 2018-03-14 ENCOUNTER — OFFICE VISIT (OUTPATIENT)
Dept: CARDIOLOGY | Facility: MEDICAL CENTER | Age: 68
End: 2018-03-14
Payer: MEDICARE

## 2018-03-14 VITALS
HEART RATE: 76 BPM | OXYGEN SATURATION: 97 % | HEIGHT: 74 IN | SYSTOLIC BLOOD PRESSURE: 168 MMHG | BODY MASS INDEX: 26.44 KG/M2 | DIASTOLIC BLOOD PRESSURE: 90 MMHG | WEIGHT: 206 LBS

## 2018-03-14 DIAGNOSIS — G47.33 OBSTRUCTIVE SLEEP APNEA: ICD-10-CM

## 2018-03-14 DIAGNOSIS — E78.5 DYSLIPIDEMIA: ICD-10-CM

## 2018-03-14 DIAGNOSIS — Z95.1 S/P CABG X 3: ICD-10-CM

## 2018-03-14 DIAGNOSIS — I25.10 CORONARY ARTERY DISEASE INVOLVING NATIVE CORONARY ARTERY OF NATIVE HEART WITHOUT ANGINA PECTORIS: ICD-10-CM

## 2018-03-14 DIAGNOSIS — I10 BENIGN ESSENTIAL HTN: ICD-10-CM

## 2018-03-14 DIAGNOSIS — I10 ESSENTIAL HYPERTENSION, BENIGN: ICD-10-CM

## 2018-03-14 DIAGNOSIS — I25.111 CORONARY ARTERY DISEASE INVOLVING NATIVE CORONARY ARTERY OF NATIVE HEART WITH ANGINA PECTORIS WITH DOCUMENTED SPASM (HCC): ICD-10-CM

## 2018-03-14 PROCEDURE — 99213 OFFICE O/P EST LOW 20 MIN: CPT | Performed by: INTERNAL MEDICINE

## 2018-03-14 RX ORDER — CARVEDILOL 6.25 MG/1
6.25 TABLET ORAL 2 TIMES DAILY WITH MEALS
Qty: 180 TAB | Refills: 3 | Status: SHIPPED | OUTPATIENT
Start: 2018-03-14 | End: 2019-03-11 | Stop reason: SDUPTHER

## 2018-03-14 RX ORDER — ATORVASTATIN CALCIUM 40 MG/1
40 TABLET, FILM COATED ORAL DAILY
Qty: 90 TAB | Refills: 3 | Status: SHIPPED | OUTPATIENT
Start: 2018-03-14 | End: 2019-03-11 | Stop reason: SDUPTHER

## 2018-03-14 RX ORDER — EZETIMIBE 10 MG/1
10 TABLET ORAL DAILY
Qty: 90 TAB | Refills: 3 | Status: SHIPPED | OUTPATIENT
Start: 2018-03-14 | End: 2018-09-25

## 2018-03-14 RX ORDER — LISINOPRIL 10 MG/1
10 TABLET ORAL 2 TIMES DAILY
Qty: 180 TAB | Refills: 3 | Status: SHIPPED | OUTPATIENT
Start: 2018-03-14 | End: 2019-03-11

## 2018-03-14 NOTE — PROGRESS NOTES
Subjective:   Deniz Julio is a 68 y.o. male who presents today for followup of ischemic cardiomyopathy with recovered EF and CAD with three-vessel CAD status post CABG on 2/16/15 (3 vessels), CKD, postoperative DVT. He is feeling well, NYHA class I, walks 3 miles per day and continues to lose significant amounts of weight due to diet and exercise. His ejection fraction has normalized. Tolerating medical therapy.     Now on CPAP for HEYDI. Has gained a little bit more weight which is muscle mass. Exercising 2 hours 4 times per week with no exertional symptoms. LDL is 88 on 20 mg daily of Lipitor.    Denies any other cardiovascular symptoms including chest pain, shortness of breath, dyspnea on exertion, lightheadedness, syncope or presyncope, lower extremity edema, PND, orthopnea or palpitations.      Past Surgical History:   Procedure Laterality Date   • MULTIPLE CORONARY ARTERY BYPASS ENDO VEIN HARVEST  2/16/2015    Performed by Ino Culver M.D. at SURGERY [facility]   • TONSILLECTOMY       Family History   Problem Relation Age of Onset   • Heart Disease Mother    • Heart Disease Father    • Other Father      GI Bleeding     History   Smoking Status   • Former Smoker   • Types: Cigarettes   Smokeless Tobacco   • Never Used     Allergies   Allergen Reactions   • Tetracycline      Unknown rxn.   • Tripelennamine      Unknown rxn.     Outpatient Encounter Prescriptions as of 3/14/2018   Medication Sig Dispense Refill   • ezetimibe (ZETIA) 10 MG Tab Take 1 Tab by mouth every day. 90 Tab 3   • lisinopril (PRINIVIL) 10 MG Tab Take 1 Tab by mouth 2 times a day. 180 Tab 3   • carvedilol (COREG) 6.25 MG Tab Take 1 Tab by mouth 2 times a day, with meals. 180 Tab 3   • atorvastatin (LIPITOR) 40 MG Tab Take 1 Tab by mouth every day. 90 Tab 3   • aspirin 81 MG EC tablet Take 1 Tab by mouth every day. 30 Tab    • cyanocobalamin (VITAMIN B12) 1000 MCG TABS Take 1 Tab by mouth every day. 30 Tab 0   • [DISCONTINUED]  "lisinopril (PRINIVIL) 10 MG Tab Take 1 Tab by mouth 2 times a day. 180 Tab 3   • [DISCONTINUED] carvedilol (COREG) 6.25 MG Tab Take 1 Tab by mouth 2 times a day, with meals. 180 Tab 3   • [DISCONTINUED] atorvastatin (LIPITOR) 40 MG Tab Take 1 Tab by mouth every day. 90 Tab 3     No facility-administered encounter medications on file as of 3/14/2018.      Review of Systems   All other systems reviewed and are negative.       Objective:   BP (!) 168/90   Pulse 76   Ht 1.867 m (6' 1.5\")   Wt 93.4 kg (206 lb)   SpO2 97%   BMI 26.81 kg/m²     Physical Exam   Constitutional: He is oriented to person, place, and time. He appears well-developed and well-nourished. No distress.   Continued weight loss. Looking well.   HENT:   Head: Normocephalic and atraumatic.   Mouth/Throat: Oropharynx is clear and moist.   Eyes: Conjunctivae are normal. Pupils are equal, round, and reactive to light. No scleral icterus.   Neck: No JVD present. No tracheal deviation present. No thyromegaly present.   Cardiovascular: Normal rate, regular rhythm, S1 normal, normal heart sounds and intact distal pulses.  PMI is not displaced.  Exam reveals no gallop and no friction rub.    No murmur heard.  Pulses:       Carotid pulses are 2+ on the right side, and 2+ on the left side.       Radial pulses are 2+ on the right side, and 2+ on the left side.        Dorsalis pedis pulses are 2+ on the right side, and 2+ on the left side.        Posterior tibial pulses are 2+ on the right side, and 2+ on the left side.   Pulmonary/Chest: Effort normal and breath sounds normal. He has no wheezes. He has no rales.   Well-healed surgical incision, midline   Abdominal: Soft. Bowel sounds are normal. He exhibits no distension and no pulsatile midline mass. There is no tenderness. There is no guarding.   Musculoskeletal: He exhibits no edema.   Well healing surgical incisions lower extremity   Neurological: He is alert and oriented to person, place, and time. No " cranial nerve deficit.   Skin: Skin is warm and dry. No rash noted. He is not diaphoretic. No erythema.   Psychiatric: He has a normal mood and affect. His behavior is normal. Thought content normal.     LABS:  Lab Results   Component Value Date/Time    CHOLSTRLTOT 158 02/13/2018 08:30 AM    LDL 88 02/13/2018 08:30 AM    HDL 41 02/13/2018 08:30 AM    TRIGLYCERIDE 144 02/13/2018 08:30 AM       Lab Results   Component Value Date/Time    WBC 4.7 07/07/2015 07:54 AM    WBC 6.9 03/16/2015 11:40 AM    RBC 4.49 07/07/2015 07:54 AM    RBC 4.62 (L) 03/16/2015 11:40 AM    HEMOGLOBIN 13.7 07/07/2015 07:54 AM    HEMOGLOBIN 13.8 (L) 03/16/2015 11:40 AM    HEMATOCRIT 41.5 07/07/2015 07:54 AM    HEMATOCRIT 42.8 03/16/2015 11:40 AM    MCV 92 07/07/2015 07:54 AM    MCV 92.6 03/16/2015 11:40 AM    NEUTSPOLYS 36.7 (L) 03/16/2015 11:40 AM    LYMPHOCYTES 47.6 (H) 03/16/2015 11:40 AM    MONOCYTES 9.4 03/16/2015 11:40 AM    EOSINOPHILS 4.9 03/16/2015 11:40 AM    BASOPHILS 1.3 03/16/2015 11:40 AM     Lab Results   Component Value Date/Time    SODIUM 137 03/16/2015 11:40 AM    POTASSIUM 4.6 03/16/2015 11:40 AM    CHLORIDE 100 03/16/2015 11:40 AM    CO2 27 03/16/2015 11:40 AM    GLUCOSE 68 03/16/2015 11:40 AM    BUN 22 03/16/2015 11:40 AM    CREATININE 0.91 03/16/2015 11:40 AM         Lab Results   Component Value Date/Time    ALKPHOSPHAT 53 02/13/2018 08:30 AM    ASTSGOT 17 02/13/2018 08:30 AM    ALTSGPT 22 02/13/2018 08:30 AM    TBILIRUBIN 0.9 02/13/2018 08:30 AM      Lab Results   Component Value Date/Time    BNPBTYPENAT 1182 (H) 03/13/2015 11:15 AM      No results found for: TSH  Lab Results   Component Value Date/Time    PROTHROMBTM 23.4 (H) 03/16/2015 11:40 AM    INR 2.2 12/29/2015          ECHO CONCLUSIONS (7/9/2015):  Normal left ventricular chamber size.  Normal left ventricular systolic function.  Left ventricular ejection fraction is 55% to 60%.  Grade II diastolic dysfunction - pseudonormal mitral inflow is    observed.    Compared to prior study of 2-6-2015 -- LVEF is now normal and diastolic   function has improved.      Assessment:     1. S/P CABG x 3     2. Coronary artery disease involving native coronary artery of native heart without angina pectoris  ezetimibe (ZETIA) 10 MG Tab   3. Dyslipidemia  ezetimibe (ZETIA) 10 MG Tab    atorvastatin (LIPITOR) 40 MG Tab   4. Benign essential HTN  lisinopril (PRINIVIL) 10 MG Tab   5. Obstructive sleep apnea     6. Essential hypertension, benign  lisinopril (PRINIVIL) 10 MG Tab   7. CAD s/p CABG with recovered LVEF  carvedilol (COREG) 6.25 MG Tab       Medical Decision Making:  Today's Assessment / Status / Plan:     He is doing extremely well from a functional and fitness standpoint. LDL is somewhat suboptimal. HDL is good. Blood pressure is high today but much better at home. He monitors it regularly. And indeed his office visit blood pressures have been perfect except for today. Recommend continuing monitoring with a goal blood pressure close to 120/80 and LDL less than 70. Recommend increasing his statin to 40 mg daily he would prefer to go to 40 mg daily and add Zetia which I think is reasonable. Continue his other medical therapy including aspirin.    His son-in-law Anupama Morse is a 1st year medical student at [employer] school of medicine.    Continue medical therapy and diet and exercise otherwise and follow up in 6M

## 2018-08-06 ENCOUNTER — HOSPITAL ENCOUNTER (OUTPATIENT)
Dept: LAB | Facility: MEDICAL CENTER | Age: 68
End: 2018-08-06
Attending: FAMILY MEDICINE
Payer: MEDICARE

## 2018-08-06 DIAGNOSIS — I25.10 CORONARY ARTERY DISEASE INVOLVING NATIVE CORONARY ARTERY OF NATIVE HEART WITHOUT ANGINA PECTORIS: ICD-10-CM

## 2018-08-06 DIAGNOSIS — E78.5 DYSLIPIDEMIA: ICD-10-CM

## 2018-08-06 DIAGNOSIS — I10 BENIGN ESSENTIAL HTN: ICD-10-CM

## 2018-08-06 LAB
25(OH)D3 SERPL-MCNC: 145 NG/ML (ref 30–100)
ALBUMIN SERPL BCP-MCNC: 4.6 G/DL (ref 3.2–4.9)
ALBUMIN/GLOB SERPL: 1.9 G/DL
ALP SERPL-CCNC: 54 U/L (ref 30–99)
ALT SERPL-CCNC: 24 U/L (ref 2–50)
ANION GAP SERPL CALC-SCNC: 5 MMOL/L (ref 0–11.9)
AST SERPL-CCNC: 17 U/L (ref 12–45)
BILIRUB SERPL-MCNC: 0.8 MG/DL (ref 0.1–1.5)
BUN SERPL-MCNC: 36 MG/DL (ref 8–22)
CALCIUM SERPL-MCNC: 9.3 MG/DL (ref 8.5–10.5)
CHLORIDE SERPL-SCNC: 108 MMOL/L (ref 96–112)
CHOLEST SERPL-MCNC: 152 MG/DL (ref 100–199)
CO2 SERPL-SCNC: 23 MMOL/L (ref 20–33)
CREAT SERPL-MCNC: 1.37 MG/DL (ref 0.5–1.4)
ERYTHROCYTE [DISTWIDTH] IN BLOOD BY AUTOMATED COUNT: 44.1 FL (ref 35.9–50)
GLOBULIN SER CALC-MCNC: 2.4 G/DL (ref 1.9–3.5)
GLUCOSE SERPL-MCNC: 104 MG/DL (ref 65–99)
HCT VFR BLD AUTO: 46.8 % (ref 42–52)
HDLC SERPL-MCNC: 49 MG/DL
HGB BLD-MCNC: 15.5 G/DL (ref 14–18)
LDLC SERPL CALC-MCNC: 85 MG/DL
MCH RBC QN AUTO: 31.8 PG (ref 27–33)
MCHC RBC AUTO-ENTMCNC: 33.1 G/DL (ref 33.7–35.3)
MCV RBC AUTO: 96.1 FL (ref 81.4–97.8)
PLATELET # BLD AUTO: 164 K/UL (ref 164–446)
PMV BLD AUTO: 12 FL (ref 9–12.9)
POTASSIUM SERPL-SCNC: 6 MMOL/L (ref 3.6–5.5)
PROT SERPL-MCNC: 7 G/DL (ref 6–8.2)
RBC # BLD AUTO: 4.87 M/UL (ref 4.7–6.1)
SODIUM SERPL-SCNC: 136 MMOL/L (ref 135–145)
T3 SERPL-MCNC: 120.6 NG/DL (ref 60–181)
T4 FREE SERPL-MCNC: 1.06 NG/DL (ref 0.53–1.43)
TRIGL SERPL-MCNC: 88 MG/DL (ref 0–149)
TSH SERPL DL<=0.005 MIU/L-ACNC: 2.38 UIU/ML (ref 0.38–5.33)
WBC # BLD AUTO: 6.4 K/UL (ref 4.8–10.8)

## 2018-08-06 PROCEDURE — 85027 COMPLETE CBC AUTOMATED: CPT

## 2018-08-06 PROCEDURE — 84443 ASSAY THYROID STIM HORMONE: CPT

## 2018-08-06 PROCEDURE — 82306 VITAMIN D 25 HYDROXY: CPT | Mod: GA

## 2018-08-06 PROCEDURE — 84480 ASSAY TRIIODOTHYRONINE (T3): CPT

## 2018-08-06 PROCEDURE — 84439 ASSAY OF FREE THYROXINE: CPT

## 2018-08-06 PROCEDURE — 80053 COMPREHEN METABOLIC PANEL: CPT

## 2018-08-06 PROCEDURE — 80061 LIPID PANEL: CPT

## 2018-08-07 ENCOUNTER — TELEPHONE (OUTPATIENT)
Dept: MEDICAL GROUP | Facility: PHYSICIAN GROUP | Age: 68
End: 2018-08-07

## 2018-09-25 ENCOUNTER — OFFICE VISIT (OUTPATIENT)
Dept: CARDIOLOGY | Facility: MEDICAL CENTER | Age: 68
End: 2018-09-25
Payer: MEDICARE

## 2018-09-25 VITALS
HEART RATE: 60 BPM | BODY MASS INDEX: 27.85 KG/M2 | DIASTOLIC BLOOD PRESSURE: 90 MMHG | SYSTOLIC BLOOD PRESSURE: 130 MMHG | HEIGHT: 74 IN | WEIGHT: 217 LBS | OXYGEN SATURATION: 96 %

## 2018-09-25 DIAGNOSIS — I25.10 CORONARY ARTERY DISEASE INVOLVING NATIVE CORONARY ARTERY OF NATIVE HEART WITHOUT ANGINA PECTORIS: ICD-10-CM

## 2018-09-25 DIAGNOSIS — E78.5 DYSLIPIDEMIA: ICD-10-CM

## 2018-09-25 DIAGNOSIS — Z95.1 S/P CABG X 3: ICD-10-CM

## 2018-09-25 DIAGNOSIS — G47.33 OBSTRUCTIVE SLEEP APNEA: ICD-10-CM

## 2018-09-25 PROCEDURE — 99213 OFFICE O/P EST LOW 20 MIN: CPT | Performed by: INTERNAL MEDICINE

## 2018-09-26 NOTE — PROGRESS NOTES
Subjective:   Deniz Julio is a 68 y.o. male who presents today for followup of ischemic cardiomyopathy with recovered EF and CAD with three-vessel CAD status post CABG on 2/16/15 (3 vessels), CKD, postoperative DVT. He is feeling well, NYHA class I, walks 3 miles per day and continues to lose significant amounts of weight due to diet and exercise. His ejection fraction has normalized. Tolerating medical therapy.     Doing well blood pressure well no symptoms highly active stable weight.    Denies any other cardiovascular symptoms including chest pain, shortness of breath, dyspnea on exertion, lightheadedness, syncope or presyncope, lower extremity edema, PND, orthopnea or palpitations.      Past Surgical History:   Procedure Laterality Date   • MULTIPLE CORONARY ARTERY BYPASS ENDO VEIN HARVEST  2/16/2015    Performed by Ino Culver M.D. at SURGERY [facility] ORS   • TONSILLECTOMY       Family History   Problem Relation Age of Onset   • Heart Disease Mother    • Heart Disease Father    • Other Father         GI Bleeding     History   Smoking Status   • Former Smoker   • Types: Cigarettes   Smokeless Tobacco   • Never Used     Allergies   Allergen Reactions   • Tetracycline      Unknown rxn.   • Tripelennamine      Unknown rxn.     Outpatient Encounter Prescriptions as of 9/25/2018   Medication Sig Dispense Refill   • Coenzyme Q10 (CO Q 10 PO) Take  by mouth.     • lisinopril (PRINIVIL) 10 MG Tab Take 1 Tab by mouth 2 times a day. 180 Tab 3   • carvedilol (COREG) 6.25 MG Tab Take 1 Tab by mouth 2 times a day, with meals. 180 Tab 3   • atorvastatin (LIPITOR) 40 MG Tab Take 1 Tab by mouth every day. 90 Tab 3   • aspirin 81 MG EC tablet Take 1 Tab by mouth every day. 30 Tab    • cyanocobalamin (VITAMIN B12) 1000 MCG TABS Take 1 Tab by mouth every day. 30 Tab 0   • [DISCONTINUED] ezetimibe (ZETIA) 10 MG Tab Take 1 Tab by mouth every day. (Patient not taking: Reported on 9/25/2018) 90 Tab 3     No  "facility-administered encounter medications on file as of 9/25/2018.      Review of Systems   All other systems reviewed and are negative.       Objective:   /90 (BP Location: Left arm, Patient Position: Sitting, BP Cuff Size: Adult)   Pulse 60   Ht 1.867 m (6' 1.5\")   Wt 98.4 kg (217 lb)   SpO2 96%   BMI 28.24 kg/m²     Physical Exam   Constitutional: He is oriented to person, place, and time. He appears well-developed and well-nourished. No distress.   Continued weight loss.  Remains overweight   HENT:   Head: Normocephalic and atraumatic.   Mouth/Throat: Oropharynx is clear and moist.   Eyes: Pupils are equal, round, and reactive to light. Conjunctivae are normal. No scleral icterus.   Neck: No JVD present. No tracheal deviation present. No thyromegaly present.   Cardiovascular: Normal rate, regular rhythm, S1 normal, normal heart sounds and intact distal pulses.  PMI is not displaced.  Exam reveals no gallop and no friction rub.    No murmur heard.  Pulses:       Carotid pulses are 2+ on the right side, and 2+ on the left side.       Radial pulses are 2+ on the right side, and 2+ on the left side.        Dorsalis pedis pulses are 2+ on the right side, and 2+ on the left side.        Posterior tibial pulses are 2+ on the right side, and 2+ on the left side.   Pulmonary/Chest: Effort normal and breath sounds normal. He has no wheezes. He has no rales.   Well-healed surgical incision, midline   Abdominal: Soft. Bowel sounds are normal. He exhibits no distension and no pulsatile midline mass. There is no tenderness. There is no guarding.   Musculoskeletal: He exhibits no edema.   Well healing surgical incisions lower extremity   Neurological: He is alert and oriented to person, place, and time. No cranial nerve deficit.   Skin: Skin is warm and dry. No rash noted. He is not diaphoretic. No erythema.   Psychiatric: He has a normal mood and affect. His behavior is normal. Thought content normal. "     LABS:  Lab Results   Component Value Date/Time    CHOLSTRLTOT 152 08/06/2018 07:27 AM    LDL 85 08/06/2018 07:27 AM    HDL 49 08/06/2018 07:27 AM    TRIGLYCERIDE 88 08/06/2018 07:27 AM       Lab Results   Component Value Date/Time    WBC 6.4 08/06/2018 07:27 AM    RBC 4.87 08/06/2018 07:27 AM    HEMOGLOBIN 15.5 08/06/2018 07:27 AM    HEMATOCRIT 46.8 08/06/2018 07:27 AM    MCV 96.1 08/06/2018 07:27 AM    NEUTSPOLYS 36.7 (L) 03/16/2015 11:40 AM    LYMPHOCYTES 47.6 (H) 03/16/2015 11:40 AM    MONOCYTES 9.4 03/16/2015 11:40 AM    EOSINOPHILS 4.9 03/16/2015 11:40 AM    BASOPHILS 1.3 03/16/2015 11:40 AM     Lab Results   Component Value Date/Time    SODIUM 136 08/06/2018 07:27 AM    POTASSIUM 6.0 (H) 08/06/2018 07:27 AM    CHLORIDE 108 08/06/2018 07:27 AM    CO2 23 08/06/2018 07:27 AM    GLUCOSE 104 (H) 08/06/2018 07:27 AM    BUN 36 (H) 08/06/2018 07:27 AM    CREATININE 1.37 08/06/2018 07:27 AM         Lab Results   Component Value Date/Time    ALKPHOSPHAT 54 08/06/2018 07:27 AM    ASTSGOT 17 08/06/2018 07:27 AM    ALTSGPT 24 08/06/2018 07:27 AM    TBILIRUBIN 0.8 08/06/2018 07:27 AM      Lab Results   Component Value Date/Time    BNPBTYPENAT 1182 (H) 03/13/2015 11:15 AM      No results found for: TSH  Lab Results   Component Value Date/Time    PROTHROMBTM 23.4 (H) 03/16/2015 11:40 AM    INR 2.2 12/29/2015          ECHO CONCLUSIONS (7/9/2015):  Normal left ventricular chamber size.  Normal left ventricular systolic function.  Left ventricular ejection fraction is 55% to 60%.  Grade II diastolic dysfunction - pseudonormal mitral inflow is   observed.    Compared to prior study of 2-6-2015 -- LVEF is now normal and diastolic   function has improved.      Assessment:     1. S/P CABG x 3     2. Coronary artery disease involving native coronary artery of native heart without angina pectoris     3. Dyslipidemia     4. Obstructive sleep apnea         Medical Decision Making:  Today's Assessment / Status / Plan:     Doing well  blood pressure lipid profile and medical therapy are good.  Activity level is good.  No symptoms.  Recommend continued medical therapy and diet and exercise.    His son-in-law Anupama Morse is a second year medical student at [employer].    Continue medical therapy and diet and exercise otherwise and follow up in 6M      Physical Exam   Constitutional: He is oriented to person, place, and time. He appears well-developed and well-nourished. No distress.   Continued weight loss.  Remains overweight   HENT:   Head: Normocephalic and atraumatic.   Mouth/Throat: Oropharynx is clear and moist.   Eyes: Pupils are equal, round, and reactive to light. Conjunctivae are normal. No scleral icterus.   Neck: No JVD present. No tracheal deviation present. No thyromegaly present.   Cardiovascular: Normal rate, regular rhythm, S1 normal, normal heart sounds and intact distal pulses.  PMI is not displaced.  Exam reveals no gallop and no friction rub.    No murmur heard.  Pulses:       Carotid pulses are 2+ on the right side, and 2+ on the left side.       Radial pulses are 2+ on the right side, and 2+ on the left side.        Dorsalis pedis pulses are 2+ on the right side, and 2+ on the left side.        Posterior tibial pulses are 2+ on the right side, and 2+ on the left side.   Pulmonary/Chest: Effort normal and breath sounds normal. He has no wheezes. He has no rales.   Well-healed surgical incision, midline   Abdominal: Soft. Bowel sounds are normal. He exhibits no distension and no pulsatile midline mass. There is no tenderness. There is no guarding.   Musculoskeletal: He exhibits no edema.   Well healing surgical incisions lower extremity   Neurological: He is alert and oriented to person, place, and time. No cranial nerve deficit.   Skin: Skin is warm and dry. No rash noted. He is not diaphoretic. No erythema.   Psychiatric: He has a normal mood and affect. His behavior is normal. Thought content  normal.

## 2018-11-05 ENCOUNTER — SLEEP CENTER VISIT (OUTPATIENT)
Dept: SLEEP MEDICINE | Facility: MEDICAL CENTER | Age: 68
End: 2018-11-05
Payer: MEDICARE

## 2018-11-05 VITALS
WEIGHT: 221 LBS | HEART RATE: 60 BPM | OXYGEN SATURATION: 98 % | RESPIRATION RATE: 16 BRPM | BODY MASS INDEX: 28.36 KG/M2 | DIASTOLIC BLOOD PRESSURE: 88 MMHG | SYSTOLIC BLOOD PRESSURE: 170 MMHG | TEMPERATURE: 97.9 F | HEIGHT: 74 IN

## 2018-11-05 DIAGNOSIS — Z87.891 FORMER SMOKER: ICD-10-CM

## 2018-11-05 DIAGNOSIS — G47.33 OBSTRUCTIVE SLEEP APNEA: Chronic | ICD-10-CM

## 2018-11-05 DIAGNOSIS — I25.10 CORONARY ARTERY DISEASE INVOLVING NATIVE CORONARY ARTERY OF NATIVE HEART WITHOUT ANGINA PECTORIS: ICD-10-CM

## 2018-11-05 DIAGNOSIS — I10 BENIGN ESSENTIAL HTN: ICD-10-CM

## 2018-11-05 DIAGNOSIS — Z95.1 S/P CABG X 3: ICD-10-CM

## 2018-11-05 PROCEDURE — 99214 OFFICE O/P EST MOD 30 MIN: CPT | Performed by: NURSE PRACTITIONER

## 2018-11-05 NOTE — PROGRESS NOTES
Chief Complaint   Patient presents with   • Apnea      CPAP 15cm H20 / Questions about pap       HPI:  Deniz Julio is a 68 y.o. year old male here today for annual HEYDI f/u.     Initially referred for sleep apnea evaluation in August 2015.  He has a history of coronary artery disease, status post 3v CABG 02/15, complicated by a postoperative DVT.  PSG indicates severe HEYDI with an AHI of 42.6, minimum oxygen saturation 77%.  Patient was successfully titrated to CPAP 14 cm H2O.  On these settings he achieved REM sleep, AHI was reduced to 3.5, minimum oxygen saturation was 93%. he continued to have poor sleep and began using oxygen bleed in to device. He then underwent titration study and CPAP 15cm indicated reduced AHI with normal oximetry. Compliance card 8/6/2018 through 11/3/2018 indicates 62% usage, average nightly usage of 3 hours 49 minutes, and an overall AHI of 2.1.  Moderate mask leaking noted.  He stopped using the device in the last week due to ear plugging consistently for 3 nights.  He feels this is resolved now.  His blood pressure is also elevated today, and he attributes this to eating a lot of food at the [facility] before coming to his appointment.  He is gained 25 pounds in the last year. He notes working out 3-4x's per week.  He continues to feel rested on therapy.  He denies morning headaches.  He is a full facemask and notes periodic leaking.  He does have occasional dry mouth.  He denies any heart issues in the last year.  He denies any respiratory symptoms.      ROS: As per HPI and otherwise negative if not stated.    Past Medical History:   Diagnosis Date   • CAD (coronary artery disease), native coronary artery    • Chronic kidney disease    • DVT (deep venous thrombosis) (HCC)     had dvt after harvest of thigh vein, goes to coumadin clini   • Hypertension    • Obesity    • HEYDI (obstructive sleep apnea)        Past Surgical History:   Procedure Laterality Date   • MULTIPLE CORONARY ARTERY BYPASS  "ENDO VEIN HARVEST  2/16/2015    Performed by Ino Culver M.D. at SURGERY [facility] ORS   • TONSILLECTOMY         Family History   Problem Relation Age of Onset   • Heart Disease Mother    • Heart Disease Father    • Other Father         GI Bleeding       Social History     Social History   • Marital status:      Spouse name: N/A   • Number of children: N/A   • Years of education: N/A     Occupational History   • Not on file.     Social History Main Topics   • Smoking status: Former Smoker     Types: Cigarettes   • Smokeless tobacco: Never Used   • Alcohol use 0.0 oz/week      Comment: Wine occ   • Drug use: No   • Sexual activity: Yes     Partners: Female     Other Topics Concern   • Not on file     Social History Narrative   • No narrative on file       Allergies as of 11/05/2018 - Reviewed 11/05/2018   Allergen Reaction Noted   • Tetracycline  02/05/2015   • Tripelennamine  02/05/2015        @Vital signs for this encounter:  Vitals:    11/05/18 1238   Height: 1.867 m (6' 1.5\")   Weight: 100.2 kg (221 lb)   Weight % change since last entry.: 0 %   BP: (!) 170/88   Pulse: 60   BMI (Calculated): 28.76   Resp: 16   Temp: 36.6 °C (97.9 °F)   TempSrc: Temporal   O2 sat % room air: 98 %       Current medications as of today   Current Outpatient Prescriptions   Medication Sig Dispense Refill   • Coenzyme Q10 (CO Q 10 PO) Take  by mouth.     • lisinopril (PRINIVIL) 10 MG Tab Take 1 Tab by mouth 2 times a day. 180 Tab 3   • carvedilol (COREG) 6.25 MG Tab Take 1 Tab by mouth 2 times a day, with meals. 180 Tab 3   • atorvastatin (LIPITOR) 40 MG Tab Take 1 Tab by mouth every day. 90 Tab 3   • aspirin 81 MG EC tablet Take 1 Tab by mouth every day. 30 Tab    • cyanocobalamin (VITAMIN B12) 1000 MCG TABS Take 1 Tab by mouth every day. 30 Tab 0     No current facility-administered medications for this visit.          Physical Exam:   Gen:           Alert and oriented, No apparent distress. Mood and affect appropriate, " normal interaction with examiner.  Eyes:          PERRL, EOM intact, sclere white, conjunctive moist.  Ears:          Not examined.   Hearing:     Grossly intact.  Nose:          Normal, no lesions or deformities.  Dentition:    Good dentition.  Oropharynx:   Tongue normal.  Mallampati Classification: not examined.  Neck:        Supple, trachea midline, no masses.  Respiratory Effort: No intercostal retractions or use of accessory muscles.   Lung Auscultation:      Clear to auscultation bilaterally; no rales, rhonchi or wheezing.  CV:            Regular rate and rhythm. No murmurs, rubs or gallops.  Abd:           Not examined.   Lymphadenopathy: Not examined.  Gait and Station: Normal.  Digits and Nails: No clubbing, cyanosis, petechiae, or nodes.   Cranial Nerves: II-XII grossly intact.  Skin:        No rashes, lesions or ulcers noted.               Ext:           No cyanosis or edema.      Assessment:  1. Obstructive sleep apnea     2. S/P CABG x 3     3. Coronary artery disease involving native coronary artery of native heart without angina pectoris     4. Benign essential HTN     5. Former smoker     6. BMI 28.0-28.9,adult         Immunizations:    Flu:not given, advised obtaining  Pneumovax 23:2015  Prevnar 13:2017    Plan:  1.  Continue CPAP nightly.  DME mask/supplies.  2.  Ear plugging continues, recommended adding Flonase prior to bedtime.  It is still continues, consider referral to ENT.  3.  Discussed sleep hygiene.  Encouraged consistent nightly usage.  4.  Encourage weight loss.  Continue regular exercise but monitor dietary intake.  5.  Follow-up in 1 year with compliance card, sooner if needed.    Please note that this dictation was created using voice recognition software. I have made every reasonable attempt to correct obvious errors, but it is possible there are errors of grammar and possibly content that I did not discover before finalizing the note.

## 2019-03-11 ENCOUNTER — OFFICE VISIT (OUTPATIENT)
Dept: MEDICAL GROUP | Facility: PHYSICIAN GROUP | Age: 69
End: 2019-03-11
Payer: MEDICARE

## 2019-03-11 VITALS
HEART RATE: 61 BPM | DIASTOLIC BLOOD PRESSURE: 92 MMHG | WEIGHT: 222.1 LBS | SYSTOLIC BLOOD PRESSURE: 162 MMHG | RESPIRATION RATE: 12 BRPM | TEMPERATURE: 97.3 F | BODY MASS INDEX: 29.43 KG/M2 | HEIGHT: 73 IN | OXYGEN SATURATION: 95 %

## 2019-03-11 DIAGNOSIS — I10 BENIGN ESSENTIAL HTN: ICD-10-CM

## 2019-03-11 DIAGNOSIS — E78.5 DYSLIPIDEMIA: ICD-10-CM

## 2019-03-11 DIAGNOSIS — I25.10 CORONARY ARTERY DISEASE INVOLVING NATIVE CORONARY ARTERY OF NATIVE HEART WITHOUT ANGINA PECTORIS: ICD-10-CM

## 2019-03-11 DIAGNOSIS — I25.111 CORONARY ARTERY DISEASE INVOLVING NATIVE CORONARY ARTERY OF NATIVE HEART WITH ANGINA PECTORIS WITH DOCUMENTED SPASM (HCC): ICD-10-CM

## 2019-03-11 PROCEDURE — 99214 OFFICE O/P EST MOD 30 MIN: CPT | Performed by: FAMILY MEDICINE

## 2019-03-11 RX ORDER — LISINOPRIL AND HYDROCHLOROTHIAZIDE 25; 20 MG/1; MG/1
1 TABLET ORAL DAILY
Qty: 90 TAB | Refills: 1 | Status: SHIPPED | OUTPATIENT
Start: 2019-03-11 | End: 2019-09-22 | Stop reason: SDUPTHER

## 2019-03-11 RX ORDER — ATORVASTATIN CALCIUM 40 MG/1
40 TABLET, FILM COATED ORAL DAILY
Qty: 90 TAB | Refills: 3 | Status: SHIPPED | OUTPATIENT
Start: 2019-03-11 | End: 2019-11-05 | Stop reason: SDUPTHER

## 2019-03-11 RX ORDER — CARVEDILOL 6.25 MG/1
6.25 TABLET ORAL 2 TIMES DAILY WITH MEALS
Qty: 180 TAB | Refills: 3 | Status: SHIPPED | OUTPATIENT
Start: 2019-03-11 | End: 2019-11-05 | Stop reason: SDUPTHER

## 2019-03-11 ASSESSMENT — ENCOUNTER SYMPTOMS
BLURRED VISION: 0
DOUBLE VISION: 0
PALPITATIONS: 0
RESPIRATORY NEGATIVE: 1
GASTROINTESTINAL NEGATIVE: 1
FEVER: 0
NEUROLOGICAL NEGATIVE: 1
DEPRESSION: 0
CARDIOVASCULAR NEGATIVE: 1
COUGH: 0
CONSTITUTIONAL NEGATIVE: 1
EYES NEGATIVE: 1
PSYCHIATRIC NEGATIVE: 1
MYALGIAS: 0
MUSCULOSKELETAL NEGATIVE: 1
CHILLS: 0
BRUISES/BLEEDS EASILY: 0
HEARTBURN: 0
HEMOPTYSIS: 0
HEADACHES: 0
NAUSEA: 0
TINGLING: 0
DIZZINESS: 0

## 2019-03-11 ASSESSMENT — PATIENT HEALTH QUESTIONNAIRE - PHQ9: CLINICAL INTERPRETATION OF PHQ2 SCORE: 0

## 2019-03-11 NOTE — PROGRESS NOTES
Subjective:      Deniz Julio is a 69 y.o. male who presents with Hypertension            1. Coronary artery disease involving native coronary artery of native heart without angina pectoris  The patient's current medical issue is well controlled on the current therapy with no new symptoms or worsening    - BETA CAROTENE PO; Take  by mouth.  - lisinopril-hydrochlorothiazide (PRINZIDE, ZESTORETIC) 20-25 MG per tablet; Take 1 Tab by mouth every day.  Dispense: 90 Tab; Refill: 1  - carvedilol (COREG) 6.25 MG Tab; Take 1 Tab by mouth 2 times a day, with meals.  Dispense: 180 Tab; Refill: 3  - atorvastatin (LIPITOR) 40 MG Tab; Take 1 Tab by mouth every day.  Dispense: 90 Tab; Refill: 3  - Comp Metabolic Panel; Future  - Lipid Profile; Future  - CBC WITHOUT DIFFERENTIAL; Future    2. Dyslipidemia  Currently treated for HLD, taking meds with no new myalgias or joint pain, watching fats in diet  controlled    - BETA CAROTENE PO; Take  by mouth.  - lisinopril-hydrochlorothiazide (PRINZIDE, ZESTORETIC) 20-25 MG per tablet; Take 1 Tab by mouth every day.  Dispense: 90 Tab; Refill: 1  - carvedilol (COREG) 6.25 MG Tab; Take 1 Tab by mouth 2 times a day, with meals.  Dispense: 180 Tab; Refill: 3  - atorvastatin (LIPITOR) 40 MG Tab; Take 1 Tab by mouth every day.  Dispense: 90 Tab; Refill: 3  - Comp Metabolic Panel; Future  - Lipid Profile; Future  - CBC WITHOUT DIFFERENTIAL; Future    3. Benign essential HTN  bp sub optimal will increase dose of ace and add hctz and f/u in 2 mo  - BETA CAROTENE PO; Take  by mouth.  - lisinopril-hydrochlorothiazide (PRINZIDE, ZESTORETIC) 20-25 MG per tablet; Take 1 Tab by mouth every day.  Dispense: 90 Tab; Refill: 1  - carvedilol (COREG) 6.25 MG Tab; Take 1 Tab by mouth 2 times a day, with meals.  Dispense: 180 Tab; Refill: 3  - atorvastatin (LIPITOR) 40 MG Tab; Take 1 Tab by mouth every day.  Dispense: 90 Tab; Refill: 3  - Comp Metabolic Panel; Future  - Lipid Profile; Future  - CBC WITHOUT  DIFFERENTIAL; Future    4. CAD s/p CABG with recovered LVEF  The patient's current medical issue is well controlled on the current therapy with no new symptoms or worsening    - BETA CAROTENE PO; Take  by mouth.  - lisinopril-hydrochlorothiazide (PRINZIDE, ZESTORETIC) 20-25 MG per tablet; Take 1 Tab by mouth every day.  Dispense: 90 Tab; Refill: 1  - carvedilol (COREG) 6.25 MG Tab; Take 1 Tab by mouth 2 times a day, with meals.  Dispense: 180 Tab; Refill: 3  - atorvastatin (LIPITOR) 40 MG Tab; Take 1 Tab by mouth every day.  Dispense: 90 Tab; Refill: 3  - Comp Metabolic Panel; Future  - Lipid Profile; Future  - CBC WITHOUT DIFFERENTIAL; Future    Past Medical History:  No date: CAD (coronary artery disease), native coronary artery  No date: Chronic kidney disease  No date: DVT (deep venous thrombosis) ([facility])      Comment:  had dvt after harvest of thigh vein, goes to coumadin                clini  No date: Hypertension  No date: Obesity  No date: HEYDI (obstructive sleep apnea)  Past Surgical History:  2/16/2015: MULTIPLE CORONARY ARTERY BYPASS ENDO VEIN HARVEST      Comment:  Performed by Ino Culver M.D. at SURGERY [facility]  No date: TONSILLECTOMY  Smoking status: Former Smoker                                                              Packs/day: 0.00      Years: 0.00         Types: Cigarettes  Smokeless tobacco: Never Used                      Alcohol use: Yes           0.0 oz/week     Comment: Wine occ    Review of patient's family history indicates:  Problem: Heart Disease      Relation: Mother       Age of Onset: (Not Specified)   Problem: Heart Disease      Relation: Father       Age of Onset: (Not Specified)   Problem: Other      Relation: Father       Age of Onset: (Not Specified)       Comment: GI Bleeding      Current Outpatient Prescriptions: •  BETA CAROTENE PO, Take  by mouth., Disp: , Rfl: •  lisinopril-hydrochlorothiazide (PRINZIDE, ZESTORETIC) 20-25 MG per tablet, Take 1 Tab  "by mouth every day., Disp: 90 Tab, Rfl: 1•  carvedilol (COREG) 6.25 MG Tab, Take 1 Tab by mouth 2 times a day, with meals., Disp: 180 Tab, Rfl: 3•  atorvastatin (LIPITOR) 40 MG Tab, Take 1 Tab by mouth every day., Disp: 90 Tab, Rfl: 3•  Coenzyme Q10 (CO Q 10 PO), Take  by mouth., Disp: , Rfl: •  aspirin 81 MG EC tablet, Take 1 Tab by mouth every day., Disp: 30 Tab, Rfl: •  cyanocobalamin (VITAMIN B12) 1000 MCG TABS, Take 1 Tab by mouth every day., Disp: 30 Tab, Rfl: 0    Patient was instructed on the use of medications, either prescriptions or OTC and informed on when the appropriate follow up time period should be. In addition, patient was also instructed that should any acute worsening occur that they should notify this clinic asap or call 911.            Review of Systems   Constitutional: Negative.  Negative for chills and fever.   HENT: Negative.  Negative for hearing loss.    Eyes: Negative.  Negative for blurred vision and double vision.   Respiratory: Negative.  Negative for cough and hemoptysis.    Cardiovascular: Negative.  Negative for chest pain and palpitations.   Gastrointestinal: Negative.  Negative for heartburn and nausea.   Genitourinary: Negative.  Negative for dysuria.   Musculoskeletal: Negative.  Negative for myalgias.   Skin: Negative.  Negative for rash.   Neurological: Negative.  Negative for dizziness, tingling and headaches.   Endo/Heme/Allergies: Negative.  Does not bruise/bleed easily.   Psychiatric/Behavioral: Negative.  Negative for depression and suicidal ideas.   All other systems reviewed and are negative.         Objective:     BP (!) 162/92 (BP Location: Left arm, Patient Position: Sitting)   Pulse 61   Temp 36.3 °C (97.3 °F)   Resp 12   Ht 1.854 m (6' 1\")   Wt 100.7 kg (222 lb 1.6 oz)   SpO2 95%   BMI 29.30 kg/m²      Physical Exam   Constitutional: He is oriented to person, place, and time. He appears well-developed and well-nourished. No distress.   HENT:   Head: " Normocephalic and atraumatic.   Mouth/Throat: Oropharynx is clear and moist. No oropharyngeal exudate.   Eyes: Pupils are equal, round, and reactive to light.   Cardiovascular: Normal rate, regular rhythm, normal heart sounds and intact distal pulses.  Exam reveals no gallop and no friction rub.    No murmur heard.  Pulmonary/Chest: Effort normal and breath sounds normal. No respiratory distress. He has no wheezes. He has no rales. He exhibits no tenderness.   Neurological: He is alert and oriented to person, place, and time.   Skin: He is not diaphoretic.   Psychiatric: He has a normal mood and affect. His behavior is normal. Judgment and thought content normal.   Nursing note and vitals reviewed.              Assessment/Plan:     1. Coronary artery disease involving native coronary artery of native heart without angina pectoris    - BETA CAROTENE PO; Take  by mouth.  - lisinopril-hydrochlorothiazide (PRINZIDE, ZESTORETIC) 20-25 MG per tablet; Take 1 Tab by mouth every day.  Dispense: 90 Tab; Refill: 1  - carvedilol (COREG) 6.25 MG Tab; Take 1 Tab by mouth 2 times a day, with meals.  Dispense: 180 Tab; Refill: 3  - atorvastatin (LIPITOR) 40 MG Tab; Take 1 Tab by mouth every day.  Dispense: 90 Tab; Refill: 3  - Comp Metabolic Panel; Future  - Lipid Profile; Future  - CBC WITHOUT DIFFERENTIAL; Future    2. Dyslipidemia    - BETA CAROTENE PO; Take  by mouth.  - lisinopril-hydrochlorothiazide (PRINZIDE, ZESTORETIC) 20-25 MG per tablet; Take 1 Tab by mouth every day.  Dispense: 90 Tab; Refill: 1  - carvedilol (COREG) 6.25 MG Tab; Take 1 Tab by mouth 2 times a day, with meals.  Dispense: 180 Tab; Refill: 3  - atorvastatin (LIPITOR) 40 MG Tab; Take 1 Tab by mouth every day.  Dispense: 90 Tab; Refill: 3  - Comp Metabolic Panel; Future  - Lipid Profile; Future  - CBC WITHOUT DIFFERENTIAL; Future    3. Benign essential HTN    - BETA CAROTENE PO; Take  by mouth.  - lisinopril-hydrochlorothiazide (PRINZIDE, ZESTORETIC) 20-25  MG per tablet; Take 1 Tab by mouth every day.  Dispense: 90 Tab; Refill: 1  - carvedilol (COREG) 6.25 MG Tab; Take 1 Tab by mouth 2 times a day, with meals.  Dispense: 180 Tab; Refill: 3  - atorvastatin (LIPITOR) 40 MG Tab; Take 1 Tab by mouth every day.  Dispense: 90 Tab; Refill: 3  - Comp Metabolic Panel; Future  - Lipid Profile; Future  - CBC WITHOUT DIFFERENTIAL; Future    4. CAD s/p CABG with recovered LVEF    - BETA CAROTENE PO; Take  by mouth.  - lisinopril-hydrochlorothiazide (PRINZIDE, ZESTORETIC) 20-25 MG per tablet; Take 1 Tab by mouth every day.  Dispense: 90 Tab; Refill: 1  - carvedilol (COREG) 6.25 MG Tab; Take 1 Tab by mouth 2 times a day, with meals.  Dispense: 180 Tab; Refill: 3  - atorvastatin (LIPITOR) 40 MG Tab; Take 1 Tab by mouth every day.  Dispense: 90 Tab; Refill: 3  - Comp Metabolic Panel; Future  - Lipid Profile; Future  - CBC WITHOUT DIFFERENTIAL; Future

## 2019-03-15 ENCOUNTER — OFFICE VISIT (OUTPATIENT)
Dept: CARDIOLOGY | Facility: MEDICAL CENTER | Age: 69
End: 2019-03-15
Payer: MEDICARE

## 2019-03-15 VITALS
DIASTOLIC BLOOD PRESSURE: 88 MMHG | HEIGHT: 73 IN | WEIGHT: 214 LBS | HEART RATE: 52 BPM | OXYGEN SATURATION: 98 % | BODY MASS INDEX: 28.36 KG/M2 | SYSTOLIC BLOOD PRESSURE: 160 MMHG

## 2019-03-15 DIAGNOSIS — I25.10 CORONARY ARTERY DISEASE INVOLVING NATIVE CORONARY ARTERY OF NATIVE HEART WITHOUT ANGINA PECTORIS: ICD-10-CM

## 2019-03-15 DIAGNOSIS — Z95.1 S/P CABG X 3: ICD-10-CM

## 2019-03-15 DIAGNOSIS — G47.33 OBSTRUCTIVE SLEEP APNEA: Chronic | ICD-10-CM

## 2019-03-15 DIAGNOSIS — E78.5 DYSLIPIDEMIA: ICD-10-CM

## 2019-03-15 DIAGNOSIS — I10 BENIGN ESSENTIAL HTN: ICD-10-CM

## 2019-03-15 PROCEDURE — 99214 OFFICE O/P EST MOD 30 MIN: CPT | Performed by: INTERNAL MEDICINE

## 2019-03-15 NOTE — PROGRESS NOTES
Subjective:   Deniz Julio is a 69 y.o. male who presents today for followup of ischemic cardiomyopathy with recovered EF and CAD with three-vessel CAD status post CABG on 2/16/15 (3 vessels), CKD, postoperative DVT. He is feeling well, NYHA class I, walks 3 miles per day and continues to lose significant amounts of weight due to diet and exercise. His ejection fraction has normalized. Tolerating medical therapy.     In the interim he has been doing well until he stopped his CPAP therapy due to a toothache.  Subsequently he has noticed much increased blood pressure despite no change in his medical therapy, up to 190s prompting him to visit the emergency department where and workup was apparently unremarkable.  He is a symptomatic.      Past Surgical History:   Procedure Laterality Date   • MULTIPLE CORONARY ARTERY BYPASS ENDO VEIN HARVEST  2/16/2015    Performed by Ino Culver M.D. at SURGERY [facility] ORS   • TONSILLECTOMY       Family History   Problem Relation Age of Onset   • Heart Disease Mother    • Heart Disease Father    • Other Father         GI Bleeding     History   Smoking Status   • Former Smoker   • Types: Cigarettes   Smokeless Tobacco   • Never Used     Allergies   Allergen Reactions   • Tetracycline      Unknown rxn.   • Tripelennamine      Unknown rxn.     Outpatient Encounter Prescriptions as of 3/15/2019   Medication Sig Dispense Refill   • Multiple Minerals-Vitamins (PAULA-MAG-ZINC-D PO) Take  by mouth.     • BETA CAROTENE PO Take  by mouth.     • lisinopril-hydrochlorothiazide (PRINZIDE, ZESTORETIC) 20-25 MG per tablet Take 1 Tab by mouth every day. 90 Tab 1   • carvedilol (COREG) 6.25 MG Tab Take 1 Tab by mouth 2 times a day, with meals. 180 Tab 3   • atorvastatin (LIPITOR) 40 MG Tab Take 1 Tab by mouth every day. 90 Tab 3   • Coenzyme Q10 (CO Q 10 PO) Take  by mouth.     • aspirin 81 MG EC tablet Take 1 Tab by mouth every day. 30 Tab    • cyanocobalamin (VITAMIN B12) 1000 MCG TABS Take 1  "Tab by mouth every day. (Patient not taking: Reported on 3/15/2019) 30 Tab 0     No facility-administered encounter medications on file as of 3/15/2019.      Review of Systems   All other systems reviewed and are negative.       Objective:   /88 (BP Location: Left arm, Patient Position: Sitting)   Pulse (!) 52   Ht 1.854 m (6' 1\")   Wt 97.1 kg (214 lb)   SpO2 98%   BMI 28.23 kg/m²     Physical Exam   Constitutional: He is oriented to person, place, and time. He appears well-developed and well-nourished. No distress.   Continued weight loss.  Remains overweight   HENT:   Head: Normocephalic and atraumatic.   Mouth/Throat: Oropharynx is clear and moist.   Eyes: Pupils are equal, round, and reactive to light. Conjunctivae are normal. No scleral icterus.   Neck: No JVD present. No tracheal deviation present. No thyromegaly present.   Cardiovascular: Normal rate, regular rhythm, S1 normal, normal heart sounds and intact distal pulses.  PMI is not displaced.  Exam reveals no gallop and no friction rub.    No murmur heard.  Pulses:       Carotid pulses are 2+ on the right side, and 2+ on the left side.       Radial pulses are 2+ on the right side, and 2+ on the left side.        Dorsalis pedis pulses are 2+ on the right side, and 2+ on the left side.        Posterior tibial pulses are 2+ on the right side, and 2+ on the left side.   Pulmonary/Chest: Effort normal and breath sounds normal. He has no wheezes. He has no rales.   Well-healed surgical incision, midline   Abdominal: Soft. Bowel sounds are normal. He exhibits no distension and no pulsatile midline mass. There is no tenderness. There is no guarding.   Musculoskeletal: He exhibits no edema.   Well healing surgical incisions lower extremity   Neurological: He is alert and oriented to person, place, and time. No cranial nerve deficit.   Skin: Skin is warm and dry. No rash noted. He is not diaphoretic. No erythema.   Psychiatric: He has a normal mood and " affect. His behavior is normal. Thought content normal.     LABS:  Lab Results   Component Value Date/Time    CHOLSTRLTOT 152 08/06/2018 07:27 AM    LDL 85 08/06/2018 07:27 AM    HDL 49 08/06/2018 07:27 AM    TRIGLYCERIDE 88 08/06/2018 07:27 AM       Lab Results   Component Value Date/Time    WBC 6.4 08/06/2018 07:27 AM    RBC 4.87 08/06/2018 07:27 AM    HEMOGLOBIN 15.5 08/06/2018 07:27 AM    HEMATOCRIT 46.8 08/06/2018 07:27 AM    MCV 96.1 08/06/2018 07:27 AM    NEUTSPOLYS 36.7 (L) 03/16/2015 11:40 AM    LYMPHOCYTES 47.6 (H) 03/16/2015 11:40 AM    MONOCYTES 9.4 03/16/2015 11:40 AM    EOSINOPHILS 4.9 03/16/2015 11:40 AM    BASOPHILS 1.3 03/16/2015 11:40 AM     Lab Results   Component Value Date/Time    SODIUM 136 08/06/2018 07:27 AM    POTASSIUM 6.0 (H) 08/06/2018 07:27 AM    CHLORIDE 108 08/06/2018 07:27 AM    CO2 23 08/06/2018 07:27 AM    GLUCOSE 104 (H) 08/06/2018 07:27 AM    BUN 36 (H) 08/06/2018 07:27 AM    CREATININE 1.37 08/06/2018 07:27 AM         Lab Results   Component Value Date/Time    ALKPHOSPHAT 54 08/06/2018 07:27 AM    ASTSGOT 17 08/06/2018 07:27 AM    ALTSGPT 24 08/06/2018 07:27 AM    TBILIRUBIN 0.8 08/06/2018 07:27 AM      Lab Results   Component Value Date/Time    BNPBTYPENAT 1182 (H) 03/13/2015 11:15 AM      No results found for: TSH  Lab Results   Component Value Date/Time    PROTHROMBTM 23.4 (H) 03/16/2015 11:40 AM    INR 2.2 12/29/2015          ECHO CONCLUSIONS (7/9/2015):  Normal left ventricular chamber size.  Normal left ventricular systolic function.  Left ventricular ejection fraction is 55% to 60%.  Grade II diastolic dysfunction - pseudonormal mitral inflow is   observed.    Compared to prior study of 2-6-2015 -- LVEF is now normal and diastolic   function has improved.      Assessment:     1. Coronary artery disease involving native coronary artery of native heart without angina pectoris     2. S/P CABG x 3     3. Benign essential HTN     4. Obstructive sleep apnea     5. Dyslipidemia          Medical Decision Making:  Today's Assessment / Status / Plan:     He is doing well.  His blood pressure has elevated most likely due to noncompliance with the CPAP.  Recommend he reinstitute his CPAP therapy and monitor his blood pressures over the ensuing 1-2 months.  Should they be excessively high in the interim he may take another 1 of his lisinopril/HCTZ medications as needed.  It is my feeling that once he is back to be compliant with his CPAP therapy his blood pressures will improve over the next several months.  Goal LDL less than 70.    His son-in-law Anupama Morse is a second year medical student at [employer].    Continue medical therapy and diet and exercise otherwise and follow up in 6M      Physical Exam   Constitutional: He is oriented to person, place, and time. He appears well-developed and well-nourished. No distress.   Continued weight loss.  Remains overweight   HENT:   Head: Normocephalic and atraumatic.   Mouth/Throat: Oropharynx is clear and moist.   Eyes: Pupils are equal, round, and reactive to light. Conjunctivae are normal. No scleral icterus.   Neck: No JVD present. No tracheal deviation present. No thyromegaly present.   Cardiovascular: Normal rate, regular rhythm, S1 normal, normal heart sounds and intact distal pulses.  PMI is not displaced.  Exam reveals no gallop and no friction rub.    No murmur heard.  Pulses:       Carotid pulses are 2+ on the right side, and 2+ on the left side.       Radial pulses are 2+ on the right side, and 2+ on the left side.        Dorsalis pedis pulses are 2+ on the right side, and 2+ on the left side.        Posterior tibial pulses are 2+ on the right side, and 2+ on the left side.   Pulmonary/Chest: Effort normal and breath sounds normal. He has no wheezes. He has no rales.   Well-healed surgical incision, midline   Abdominal: Soft. Bowel sounds are normal. He exhibits no distension and no pulsatile midline mass. There is  no tenderness. There is no guarding.   Musculoskeletal: He exhibits no edema.   Well healing surgical incisions lower extremity   Neurological: He is alert and oriented to person, place, and time. No cranial nerve deficit.   Skin: Skin is warm and dry. No rash noted. He is not diaphoretic. No erythema.   Psychiatric: He has a normal mood and affect. His behavior is normal. Thought content normal.

## 2019-08-27 ENCOUNTER — HOSPITAL ENCOUNTER (OUTPATIENT)
Dept: LAB | Facility: MEDICAL CENTER | Age: 69
End: 2019-08-27
Attending: FAMILY MEDICINE
Payer: MEDICARE

## 2019-08-27 DIAGNOSIS — I25.10 CORONARY ARTERY DISEASE INVOLVING NATIVE CORONARY ARTERY OF NATIVE HEART WITHOUT ANGINA PECTORIS: ICD-10-CM

## 2019-08-27 DIAGNOSIS — I25.111 CORONARY ARTERY DISEASE INVOLVING NATIVE CORONARY ARTERY OF NATIVE HEART WITH ANGINA PECTORIS WITH DOCUMENTED SPASM (HCC): ICD-10-CM

## 2019-08-27 DIAGNOSIS — I10 BENIGN ESSENTIAL HTN: ICD-10-CM

## 2019-08-27 DIAGNOSIS — E78.5 DYSLIPIDEMIA: ICD-10-CM

## 2019-08-27 LAB
ALBUMIN SERPL BCP-MCNC: 4.3 G/DL (ref 3.2–4.9)
ALBUMIN/GLOB SERPL: 1.6 G/DL
ALP SERPL-CCNC: 51 U/L (ref 30–99)
ALT SERPL-CCNC: 17 U/L (ref 2–50)
ANION GAP SERPL CALC-SCNC: 4 MMOL/L (ref 0–11.9)
AST SERPL-CCNC: 13 U/L (ref 12–45)
BILIRUB SERPL-MCNC: 0.9 MG/DL (ref 0.1–1.5)
BUN SERPL-MCNC: 32 MG/DL (ref 8–22)
CALCIUM SERPL-MCNC: 9.6 MG/DL (ref 8.5–10.5)
CHLORIDE SERPL-SCNC: 106 MMOL/L (ref 96–112)
CHOLEST SERPL-MCNC: 159 MG/DL (ref 100–199)
CO2 SERPL-SCNC: 26 MMOL/L (ref 20–33)
CREAT SERPL-MCNC: 1.37 MG/DL (ref 0.5–1.4)
ERYTHROCYTE [DISTWIDTH] IN BLOOD BY AUTOMATED COUNT: 44.3 FL (ref 35.9–50)
FASTING STATUS PATIENT QL REPORTED: NORMAL
GLOBULIN SER CALC-MCNC: 2.7 G/DL (ref 1.9–3.5)
GLUCOSE SERPL-MCNC: 99 MG/DL (ref 65–99)
HCT VFR BLD AUTO: 45.2 % (ref 42–52)
HDLC SERPL-MCNC: 41 MG/DL
HGB BLD-MCNC: 14.7 G/DL (ref 14–18)
LDLC SERPL CALC-MCNC: 91 MG/DL
MCH RBC QN AUTO: 32.1 PG (ref 27–33)
MCHC RBC AUTO-ENTMCNC: 32.5 G/DL (ref 33.7–35.3)
MCV RBC AUTO: 98.7 FL (ref 81.4–97.8)
PLATELET # BLD AUTO: 158 K/UL (ref 164–446)
PMV BLD AUTO: 12 FL (ref 9–12.9)
POTASSIUM SERPL-SCNC: 5 MMOL/L (ref 3.6–5.5)
PROT SERPL-MCNC: 7 G/DL (ref 6–8.2)
RBC # BLD AUTO: 4.58 M/UL (ref 4.7–6.1)
SODIUM SERPL-SCNC: 136 MMOL/L (ref 135–145)
TRIGL SERPL-MCNC: 136 MG/DL (ref 0–149)
WBC # BLD AUTO: 6.1 K/UL (ref 4.8–10.8)

## 2019-08-27 PROCEDURE — 80053 COMPREHEN METABOLIC PANEL: CPT

## 2019-08-27 PROCEDURE — 80061 LIPID PANEL: CPT

## 2019-08-27 PROCEDURE — 36415 COLL VENOUS BLD VENIPUNCTURE: CPT

## 2019-08-27 PROCEDURE — 85027 COMPLETE CBC AUTOMATED: CPT

## 2019-09-22 DIAGNOSIS — I10 BENIGN ESSENTIAL HTN: ICD-10-CM

## 2019-09-22 DIAGNOSIS — E78.5 DYSLIPIDEMIA: ICD-10-CM

## 2019-09-22 DIAGNOSIS — I25.111 CORONARY ARTERY DISEASE INVOLVING NATIVE CORONARY ARTERY OF NATIVE HEART WITH ANGINA PECTORIS WITH DOCUMENTED SPASM (HCC): ICD-10-CM

## 2019-09-22 DIAGNOSIS — I25.10 CORONARY ARTERY DISEASE INVOLVING NATIVE CORONARY ARTERY OF NATIVE HEART WITHOUT ANGINA PECTORIS: ICD-10-CM

## 2019-09-23 RX ORDER — LISINOPRIL AND HYDROCHLOROTHIAZIDE 25; 20 MG/1; MG/1
TABLET ORAL
Qty: 90 TAB | Refills: 0 | Status: SHIPPED | OUTPATIENT
Start: 2019-09-23 | End: 2019-11-05 | Stop reason: SDUPTHER

## 2019-09-23 NOTE — TELEPHONE ENCOUNTER
Was the patient seen in the last year in this department? Yes    Does patient have an active prescription for medications requested? Yes    Received Request Via: Pharmacy     Last visit 3/11/2019  Last labs 8/27/2019

## 2019-11-05 ENCOUNTER — OFFICE VISIT (OUTPATIENT)
Dept: CARDIOLOGY | Facility: MEDICAL CENTER | Age: 69
End: 2019-11-05
Payer: MEDICARE

## 2019-11-05 VITALS
HEIGHT: 73 IN | SYSTOLIC BLOOD PRESSURE: 148 MMHG | DIASTOLIC BLOOD PRESSURE: 90 MMHG | BODY MASS INDEX: 29.42 KG/M2 | HEART RATE: 52 BPM | WEIGHT: 222 LBS | OXYGEN SATURATION: 97 %

## 2019-11-05 DIAGNOSIS — I25.10 CORONARY ARTERY DISEASE INVOLVING NATIVE CORONARY ARTERY OF NATIVE HEART WITHOUT ANGINA PECTORIS: ICD-10-CM

## 2019-11-05 DIAGNOSIS — E78.5 DYSLIPIDEMIA: ICD-10-CM

## 2019-11-05 DIAGNOSIS — I25.111 CORONARY ARTERY DISEASE INVOLVING NATIVE CORONARY ARTERY OF NATIVE HEART WITH ANGINA PECTORIS WITH DOCUMENTED SPASM (HCC): ICD-10-CM

## 2019-11-05 DIAGNOSIS — I10 BENIGN ESSENTIAL HTN: ICD-10-CM

## 2019-11-05 PROCEDURE — 99214 OFFICE O/P EST MOD 30 MIN: CPT | Performed by: INTERNAL MEDICINE

## 2019-11-05 RX ORDER — CARVEDILOL 6.25 MG/1
6.25 TABLET ORAL 2 TIMES DAILY WITH MEALS
Qty: 180 TAB | Refills: 3 | Status: SHIPPED | OUTPATIENT
Start: 2019-11-05 | End: 2020-03-03 | Stop reason: SDUPTHER

## 2019-11-05 RX ORDER — ATORVASTATIN CALCIUM 40 MG/1
40 TABLET, FILM COATED ORAL DAILY
Qty: 90 TAB | Refills: 3 | Status: SHIPPED | OUTPATIENT
Start: 2019-11-05 | End: 2020-03-03 | Stop reason: SDUPTHER

## 2019-11-05 RX ORDER — LISINOPRIL AND HYDROCHLOROTHIAZIDE 25; 20 MG/1; MG/1
1 TABLET ORAL DAILY
Qty: 90 TAB | Refills: 3 | Status: SHIPPED | OUTPATIENT
Start: 2019-11-05 | End: 2020-11-12

## 2019-11-05 RX ORDER — EZETIMIBE 10 MG/1
10 TABLET ORAL DAILY
Qty: 90 TAB | Refills: 3 | Status: SHIPPED | OUTPATIENT
Start: 2019-11-05 | End: 2021-03-26

## 2019-11-05 NOTE — PROGRESS NOTES
Subjective:   Deniz Julio is a 69 y.o. male who presents today for followup of ischemic cardiomyopathy with recovered EF and CAD with three-vessel CAD status post CABG on 2/16/15 (3 vessels), CKD, postoperative DVT. He is feeling well, NYHA class I, walks 3 miles per day and continues to lose significant amounts of weight due to diet and exercise. His ejection fraction has normalized. Tolerating medical therapy.     Since last visit he has resumed his CPAP and notices his blood pressures after exercising which he does 4 times per week at least are quite low.  This is been largely symptomatic as well with orthostatic symptoms.  He has been intermittently not taking his lisinopril/HCTZ on those days.  This has helped.  Overall his blood pressures are well.  There was higher in the office with white coat component but well controlled at home.      Past Surgical History:   Procedure Laterality Date   • MULTIPLE CORONARY ARTERY BYPASS ENDO VEIN HARVEST  2/16/2015    Performed by Ino Culver M.D. at SURGERY [facility] ORS   • TONSILLECTOMY       Family History   Problem Relation Age of Onset   • Heart Disease Mother    • Heart Disease Father    • Other Father         GI Bleeding     Social History     Tobacco Use   Smoking Status Former Smoker   • Types: Cigarettes   Smokeless Tobacco Never Used     Allergies   Allergen Reactions   • Tetracycline      Unknown rxn.   • Tripelennamine      Unknown rxn.     Outpatient Encounter Medications as of 11/5/2019   Medication Sig Dispense Refill   • atorvastatin (LIPITOR) 40 MG Tab Take 1 Tab by mouth every day. 90 Tab 3   • carvedilol (COREG) 6.25 MG Tab Take 1 Tab by mouth 2 times a day, with meals. 180 Tab 3   • lisinopril-hydrochlorothiazide (PRINZIDE) 20-25 MG per tablet Take 1 Tab by mouth every day. TAKE ONE TABLET BY MOUTH DAILY 90 Tab 3   • ezetimibe (ZETIA) 10 MG Tab Take 1 Tab by mouth every day. 90 Tab 3   • Multiple Minerals-Vitamins (PAULA-MAG-ZINC-D PO) Take  by  "mouth.     • BETA CAROTENE PO Take  by mouth.     • Coenzyme Q10 (CO Q 10 PO) Take  by mouth.     • aspirin 81 MG EC tablet Take 1 Tab by mouth every day. 30 Tab    • [DISCONTINUED] lisinopril-hydrochlorothiazide (PRINZIDE, ZESTORETIC) 20-25 MG per tablet TAKE ONE TABLET BY MOUTH DAILY 90 Tab 0   • [DISCONTINUED] carvedilol (COREG) 6.25 MG Tab Take 1 Tab by mouth 2 times a day, with meals. 180 Tab 3   • [DISCONTINUED] atorvastatin (LIPITOR) 40 MG Tab Take 1 Tab by mouth every day. 90 Tab 3   • cyanocobalamin (VITAMIN B12) 1000 MCG TABS Take 1 Tab by mouth every day. (Patient not taking: Reported on 3/15/2019) 30 Tab 0     No facility-administered encounter medications on file as of 11/5/2019.      Review of Systems   All other systems reviewed and are negative.       Objective:   /90 (BP Location: Left arm, Patient Position: Sitting, BP Cuff Size: Adult)   Pulse (!) 52   Ht 1.854 m (6' 1\")   Wt 100.7 kg (222 lb)   SpO2 97%   BMI 29.29 kg/m²     Physical Exam   Constitutional: He is oriented to person, place, and time. He appears well-developed and well-nourished. No distress.   Continued weight loss.  Remains overweight   HENT:   Head: Normocephalic and atraumatic.   Mouth/Throat: Oropharynx is clear and moist.   Eyes: Pupils are equal, round, and reactive to light. Conjunctivae are normal. No scleral icterus.   Neck: No JVD present. No tracheal deviation present. No thyromegaly present.   Cardiovascular: Normal rate, regular rhythm, S1 normal, normal heart sounds and intact distal pulses. PMI is not displaced. Exam reveals no gallop and no friction rub.   No murmur heard.  Pulses:       Carotid pulses are 2+ on the right side and 2+ on the left side.       Radial pulses are 2+ on the right side and 2+ on the left side.        Dorsalis pedis pulses are 2+ on the right side and 2+ on the left side.        Posterior tibial pulses are 2+ on the right side and 2+ on the left side.   Pulmonary/Chest: Effort " normal and breath sounds normal. He has no wheezes. He has no rales.   Well-healed surgical incision, midline   Abdominal: Soft. Bowel sounds are normal. He exhibits no distension and no pulsatile midline mass. There is no tenderness. There is no guarding.   Musculoskeletal:         General: No edema.   Neurological: He is alert and oriented to person, place, and time. No cranial nerve deficit.   Skin: Skin is warm and dry. No rash noted. He is not diaphoretic. No erythema.   Psychiatric: He has a normal mood and affect. His behavior is normal. Thought content normal.     LABS:  Lab Results   Component Value Date/Time    CHOLSTRLTOT 159 08/27/2019 07:35 AM    LDL 91 08/27/2019 07:35 AM    HDL 41 08/27/2019 07:35 AM    TRIGLYCERIDE 136 08/27/2019 07:35 AM       Lab Results   Component Value Date/Time    WBC 6.1 08/27/2019 07:35 AM    RBC 4.58 (L) 08/27/2019 07:35 AM    HEMOGLOBIN 14.7 08/27/2019 07:35 AM    HEMATOCRIT 45.2 08/27/2019 07:35 AM    MCV 98.7 (H) 08/27/2019 07:35 AM    NEUTSPOLYS 36.7 (L) 03/16/2015 11:40 AM    LYMPHOCYTES 47.6 (H) 03/16/2015 11:40 AM    MONOCYTES 9.4 03/16/2015 11:40 AM    EOSINOPHILS 4.9 03/16/2015 11:40 AM    BASOPHILS 1.3 03/16/2015 11:40 AM     Lab Results   Component Value Date/Time    SODIUM 136 08/27/2019 07:35 AM    POTASSIUM 5.0 08/27/2019 07:35 AM    CHLORIDE 106 08/27/2019 07:35 AM    CO2 26 08/27/2019 07:35 AM    GLUCOSE 99 08/27/2019 07:35 AM    BUN 32 (H) 08/27/2019 07:35 AM    CREATININE 1.37 08/27/2019 07:35 AM         Lab Results   Component Value Date/Time    ALKPHOSPHAT 51 08/27/2019 07:35 AM    ASTSGOT 13 08/27/2019 07:35 AM    ALTSGPT 17 08/27/2019 07:35 AM    TBILIRUBIN 0.9 08/27/2019 07:35 AM      Lab Results   Component Value Date/Time    BNPBTYPENAT 1182 (H) 03/13/2015 11:15 AM      No results found for: TSH  Lab Results   Component Value Date/Time    PROTHROMBTM 23.4 (H) 03/16/2015 11:40 AM    INR 2.2 12/29/2015          ECHO CONCLUSIONS (7/9/2015):  Normal left  ventricular chamber size.  Normal left ventricular systolic function.  Left ventricular ejection fraction is 55% to 60%.  Grade II diastolic dysfunction - pseudonormal mitral inflow is   observed.    Compared to prior study of 2-6-2015 -- LVEF is now normal and diastolic   function has improved.      Assessment:     1. Dyslipidemia  atorvastatin (LIPITOR) 40 MG Tab    carvedilol (COREG) 6.25 MG Tab    lisinopril-hydrochlorothiazide (PRINZIDE) 20-25 MG per tablet    ezetimibe (ZETIA) 10 MG Tab   2. Coronary artery disease involving native coronary artery of native heart without angina pectoris  atorvastatin (LIPITOR) 40 MG Tab    carvedilol (COREG) 6.25 MG Tab    lisinopril-hydrochlorothiazide (PRINZIDE) 20-25 MG per tablet   3. Benign essential HTN  atorvastatin (LIPITOR) 40 MG Tab    carvedilol (COREG) 6.25 MG Tab    lisinopril-hydrochlorothiazide (PRINZIDE) 20-25 MG per tablet   4. CAD s/p CABG with recovered LVEF  atorvastatin (LIPITOR) 40 MG Tab    carvedilol (COREG) 6.25 MG Tab    lisinopril-hydrochlorothiazide (PRINZIDE) 20-25 MG per tablet       Medical Decision Making:  Today's Assessment / Status / Plan:     After reinstituting his CPAP his blood pressures are now lower after exercise.  Intermittent holding of his lisinopril/HCTZ is appropriate.  He continues his Coreg without interruption.  Continue his other medical therapy.  Recommend addition of Zetia.  If this is unsuccessful in bringing his LDL lower than 70 with his diet and exercise that he has been excellent at controlling then I would recommend increasing his Lipitor to 80.    His son-in-law Anupama Morse is a second year medical student at [employer] of medicine.    Continue medical therapy and diet and exercise otherwise and follow up in 6M

## 2020-01-31 ENCOUNTER — PATIENT OUTREACH (OUTPATIENT)
Dept: HEALTH INFORMATION MANAGEMENT | Facility: OTHER | Age: 70
End: 2020-01-31

## 2020-02-01 NOTE — PROGRESS NOTES
1. Attempt #:1    2. HealthConnect Verified: no    3. Verify PCP: yes    4. Review Care Team: no    5. WebIZ Checked & Epic Updated:  · Is patient due for Tdap? YES. Patient was not notified of copay/out of pocket cost.  · Is patient due for Shingles? YES. Patient was not notified of copay/out of pocket cost.    6. Reviewed/Updated the following with patient:       •   Communication Preference Obtained? YES       •   Preferred Pharmacy? YES       •   Preferred Lab? YES       •   Family History (document living status of immediate family members and if + hx of cancer, diabetes, hypertension, hyperlipidemia, heart attack, stroke) YES    7. Annual Wellness Visit Scheduling  · Scheduling Status:Scheduled     8. Care Gap Scheduling (Attempt to Schedule EACH Overdue Care Gap!)     Health Maintenance Due   Topic Date Due   • HEPATITIS C SCREENING  1950   • IMM DTaP/Tdap/Td Vaccine (1 - Tdap) 01/05/1959   • IMM ZOSTER VACCINES (1 of 2) 01/05/1998   • Annual Wellness Visit  01/06/2018   • IMM INFLUENZA (1) 09/01/2019        Scheduled patient for Annual Wellness Visit  Patient declined Immunizations: FLU. Per pt please update chart that he doesn't get Flu vaccine   Patient prefers to discuss Immunizations: TDAP and SHINGRIX (Shingles) with PCP.     9. Jixee Activation: declined    10. Jixee Marcelino: no    11. Virtual Visits: no    12. Opt In to Text Messages: no    13. Patient was NOT advised: “This is a free wellness visit. The provider will screen for medical conditions to help you stay healthy. If you have other concerns to address you may be asked to discuss these at a separate visit or there may be an additional fee.”     14. Patient was informed to arrive 15 min prior to their scheduled appointment and bring in their medication bottles.

## 2020-03-03 ENCOUNTER — OFFICE VISIT (OUTPATIENT)
Dept: MEDICAL GROUP | Facility: PHYSICIAN GROUP | Age: 70
End: 2020-03-03
Payer: MEDICARE

## 2020-03-03 VITALS
WEIGHT: 214.5 LBS | HEART RATE: 81 BPM | RESPIRATION RATE: 16 BRPM | TEMPERATURE: 98.6 F | DIASTOLIC BLOOD PRESSURE: 68 MMHG | SYSTOLIC BLOOD PRESSURE: 132 MMHG | HEIGHT: 74 IN | OXYGEN SATURATION: 96 % | BODY MASS INDEX: 27.53 KG/M2

## 2020-03-03 DIAGNOSIS — Z00.00 MEDICARE ANNUAL WELLNESS VISIT, SUBSEQUENT: ICD-10-CM

## 2020-03-03 DIAGNOSIS — I25.111 CORONARY ARTERY DISEASE INVOLVING NATIVE CORONARY ARTERY OF NATIVE HEART WITH ANGINA PECTORIS WITH DOCUMENTED SPASM (HCC): ICD-10-CM

## 2020-03-03 DIAGNOSIS — I25.10 CORONARY ARTERY DISEASE INVOLVING NATIVE CORONARY ARTERY OF NATIVE HEART WITHOUT ANGINA PECTORIS: ICD-10-CM

## 2020-03-03 DIAGNOSIS — I10 BENIGN ESSENTIAL HTN: ICD-10-CM

## 2020-03-03 DIAGNOSIS — E78.5 DYSLIPIDEMIA: ICD-10-CM

## 2020-03-03 DIAGNOSIS — Z95.1 S/P CABG X 3: ICD-10-CM

## 2020-03-03 PROCEDURE — G0439 PPPS, SUBSEQ VISIT: HCPCS | Performed by: FAMILY MEDICINE

## 2020-03-03 RX ORDER — CARVEDILOL 6.25 MG/1
6.25 TABLET ORAL 2 TIMES DAILY WITH MEALS
Qty: 180 TAB | Refills: 3 | Status: SHIPPED | OUTPATIENT
Start: 2020-03-03 | End: 2021-03-05

## 2020-03-03 RX ORDER — ATORVASTATIN CALCIUM 40 MG/1
40 TABLET, FILM COATED ORAL DAILY
Qty: 90 TAB | Refills: 3 | Status: SHIPPED | OUTPATIENT
Start: 2020-03-03 | End: 2021-03-18

## 2020-03-03 ASSESSMENT — ENCOUNTER SYMPTOMS: GENERAL WELL-BEING: EXCELLENT

## 2020-03-03 ASSESSMENT — ACTIVITIES OF DAILY LIVING (ADL): BATHING_REQUIRES_ASSISTANCE: 0

## 2020-03-03 ASSESSMENT — FIBROSIS 4 INDEX: FIB4 SCORE: 1.44

## 2020-03-03 ASSESSMENT — PATIENT HEALTH QUESTIONNAIRE - PHQ9: CLINICAL INTERPRETATION OF PHQ2 SCORE: 0

## 2020-03-03 NOTE — PROGRESS NOTES
Chief Complaint   Patient presents with   • Annual Exam     annual exam and med refills         HPI:  Deniz is a 70 y.o. here for Medicare Annual Wellness Visit        Patient Active Problem List    Diagnosis Date Noted   • S/P CABG x 3 02/23/2015     Priority: High     Class: Acute   • Coronary artery disease involving native coronary artery of native heart without angina pectoris 02/23/2015     Priority: High     Class: Acute   • Dyslipidemia 02/23/2015     Priority: Low     Class: Acute   • Chronic kidney disease 02/23/2015     Priority: Low     Class: Acute   • Benign essential HTN 01/05/2017   • Obstructive sleep apnea 08/22/2016       Current Outpatient Medications   Medication Sig Dispense Refill   • atorvastatin (LIPITOR) 40 MG Tab Take 1 Tab by mouth every day. 90 Tab 3   • carvedilol (COREG) 6.25 MG Tab Take 1 Tab by mouth 2 times a day, with meals. 180 Tab 3   • lisinopril-hydrochlorothiazide (PRINZIDE) 20-25 MG per tablet Take 1 Tab by mouth every day. TAKE ONE TABLET BY MOUTH DAILY 90 Tab 3   • ezetimibe (ZETIA) 10 MG Tab Take 1 Tab by mouth every day. 90 Tab 3   • BETA CAROTENE PO Take  by mouth.     • Coenzyme Q10 (CO Q 10 PO) Take  by mouth.     • aspirin 81 MG EC tablet Take 1 Tab by mouth every day. 30 Tab    • Multiple Minerals-Vitamins (PAULA-MAG-ZINC-D PO) Take  by mouth.     • cyanocobalamin (VITAMIN B12) 1000 MCG TABS Take 1 Tab by mouth every day. (Patient not taking: Reported on 3/15/2019) 30 Tab 0     No current facility-administered medications for this visit.         Patient is taking medications as noted in medication list.  Current supplements as per medication list.     Allergies: Tetracycline and Tripelennamine    Current social contact/activities: yes pt goes to the [employer] daily     Is patient current with immunizations? Yes.    He  reports that he has quit smoking. His smoking use included cigarettes. He has never used smokeless tobacco. He reports current alcohol use. He reports  that he does not use drugs.  Counseling given: Not Answered        DPA/Advanced directive: Patient has Living Will, but it is not on file. Instructed to bring in a copy to scan into their chart.    ROS:    Gait: Uses no assistive device   Ostomy: No   Other tubes: No   Amputations: No   Chronic oxygen use No   Last eye exam unknown   Wears hearing aids: No   : Denies any urinary leakage during the last 6 months      Screening:        Depression Screening    Little interest or pleasure in doing things?  0 - not at all  Feeling down, depressed, or hopeless? 0 - not at all  Patient Health Questionnaire Score: 0    If depressive symptoms identified deferred to follow up visit unless specifically addressed in assessment and plan.    Interpretation of PHQ-9 Total Score   Score Severity   1-4 No Depression   5-9 Mild Depression   10-14 Moderate Depression   15-19 Moderately Severe Depression   20-27 Severe Depression    Screening for Cognitive Impairment    Three Minute Recall (village, kitchen, baby)3   /3    Spencer clock face with all 12 numbers and set the hands to show 10 past 10.       If cognitive concerns identified, deferred for follow up unless specifically addressed in assessment and plan.    Fall Risk Assessment    Has the patient had two or more falls in the last year or any fall with injury in the last year?  No  If fall risk identified, deferred for follow up unless specifically addressed in assessment and plan.    Safety Assessment    Throw rugs on floor.   n  Handrails on all stairs.   y  Good lighting in all hallways.   y  Difficulty hearing.   n  Patient counseled about all safety risks that were identified.y  Functional Assessment ADLs    Are there any barriers preventing you from cooking for yourself or meeting nutritional needs?   .  n  Are there any barriers preventing you from driving safely or obtaining transportation?   .  n  Are there any barriers preventing you from using a telephone or calling  for help?   .  n  Are there any barriers preventing you from shopping?   .  n  Are there any barriers preventing you from taking care of your own finances?   .  n  Are there any barriers preventing you from mnnanaging your medications?   .  n  Are there any barriers preventing you from showering, bathing or dressing yourself?n   .    Are you currently engaging in any exercise or physical activity?   .   y  What is your perception of your health?   .ok    Health Maintenance Summary                HEPATITIS C SCREENING Overdue 1950     IMM DTaP/Tdap/Td Vaccine Overdue 1/5/1959     IMM ZOSTER VACCINES Overdue 1/5/1998     Annual Wellness Visit Overdue 1/6/2018      Done 1/5/2017 Visit Dx: Medicare annual wellness visit, initial     Patient has more history with this topic...    IMM INFLUENZA Overdue 9/1/2019      Done 1/5/2017 Imm Admin: Influenza Vaccine Adult HD    COLONOSCOPY Next Due 3/13/2025      Patient Declined 3/13/2015           Patient Care Team:  Joshua Fu M.D. as PCP - General (Family Medicine)  [facility] as Res[facility]    Social History     Tobacco Use   • Smoking status: Former Smoker     Types: Cigarettes   • Smokeless tobacco: Never Used   Substance Use Topics   • Alcohol use: Yes     Alcohol/week: 0.0 oz     Comment: Wine occ   • Drug use: No     Family History   Problem Relation Age of Onset   • Heart Disease Mother    • Hypertension Mother    • Heart Disease Father    • Other Father         GI Bleeding   • Hypertension Father      He  has a past medical history of CAD (coronary artery disease), native coronary artery, Chronic kidney disease, DVT (deep venous thrombosis) (HCC), Hypertension, Obesity, and HEYDI (obstructive sleep apnea).   Past Surgical History:   Procedure Laterality Date   • MULTIPLE CORONARY ARTERY BYPASS ENDO VEIN HARVEST  2/16/2015    Performed by Ino Culver M.D. at SURGERY [facility]   • TONSILLECTOMY             Exam:     There were no vitals taken for this  visit. There is no height or weight on file to calculate BMI.    Hearing excellent.    Dentition good  Alert, oriented in no acute distress.  Eye contact is good, speech goal directed, affect calm      Assessment and Plan. The following treatment and monitoring plan is recommended:    1. Medicare annual wellness visit, subsequent      2. Dyslipidemia  Currently treated for HLD, taking meds with no new myalgias or joint pain, watching fats in diet  controlled      - atorvastatin (LIPITOR) 40 MG Tab; Take 1 Tab by mouth every day.  Dispense: 90 Tab; Refill: 3  - carvedilol (COREG) 6.25 MG Tab; Take 1 Tab by mouth 2 times a day, with meals.  Dispense: 180 Tab; Refill: 3    3. Coronary artery disease involving native coronary artery of native heart without angina pectoris  The patient's current medical issue is well controlled on the current therapy with no new symptoms or worsening    - atorvastatin (LIPITOR) 40 MG Tab; Take 1 Tab by mouth every day.  Dispense: 90 Tab; Refill: 3  - carvedilol (COREG) 6.25 MG Tab; Take 1 Tab by mouth 2 times a day, with meals.  Dispense: 180 Tab; Refill: 3    4. Benign essential HTN  Currently treated for HTN, taking meds with no CP or sob, monitors bp at home periodically. controlled      - atorvastatin (LIPITOR) 40 MG Tab; Take 1 Tab by mouth every day.  Dispense: 90 Tab; Refill: 3  - carvedilol (COREG) 6.25 MG Tab; Take 1 Tab by mouth 2 times a day, with meals.  Dispense: 180 Tab; Refill: 3    5. CAD s/p CABG with recovered LVEF  The patient's current medical issue is well controlled on the current therapy with no new symptoms or worsening    - atorvastatin (LIPITOR) 40 MG Tab; Take 1 Tab by mouth every day.  Dispense: 90 Tab; Refill: 3  - carvedilol (COREG) 6.25 MG Tab; Take 1 Tab by mouth 2 times a day, with meals.  Dispense: 180 Tab; Refill: 3    Past Medical History:   Diagnosis Date   • CAD (coronary artery disease), native coronary artery    • Chronic kidney disease    • DVT  (deep venous thrombosis) (HCC)     had dvt after harvest of thigh vein, goes to coumadin clini   • Hypertension    • Obesity    • HEYDI (obstructive sleep apnea)      Past Surgical History:   Procedure Laterality Date   • MULTIPLE CORONARY ARTERY BYPASS ENDO VEIN HARVEST  2/16/2015    Performed by Ino Culver M.D. at SURGERY [facility]   • TONSILLECTOMY       Social History     Tobacco Use   • Smoking status: Former Smoker     Types: Cigarettes   • Smokeless tobacco: Never Used   Substance Use Topics   • Alcohol use: Yes     Alcohol/week: 0.0 oz     Comment: Wine occ   • Drug use: No     Family History   Problem Relation Age of Onset   • Heart Disease Mother    • Hypertension Mother    • Heart Disease Father    • Other Father         GI Bleeding   • Hypertension Father          Current Outpatient Medications:   •  atorvastatin (LIPITOR) 40 MG Tab, Take 1 Tab by mouth every day., Disp: 90 Tab, Rfl: 3  •  carvedilol (COREG) 6.25 MG Tab, Take 1 Tab by mouth 2 times a day, with meals., Disp: 180 Tab, Rfl: 3  •  lisinopril-hydrochlorothiazide (PRINZIDE) 20-25 MG per tablet, Take 1 Tab by mouth every day. TAKE ONE TABLET BY MOUTH DAILY, Disp: 90 Tab, Rfl: 3  •  ezetimibe (ZETIA) 10 MG Tab, Take 1 Tab by mouth every day., Disp: 90 Tab, Rfl: 3  •  BETA CAROTENE PO, Take  by mouth., Disp: , Rfl:   •  Coenzyme Q10 (CO Q 10 PO), Take  by mouth., Disp: , Rfl:   •  aspirin 81 MG EC tablet, Take 1 Tab by mouth every day., Disp: 30 Tab, Rfl:   •  Multiple Minerals-Vitamins (PAULA-MAG-ZINC-D PO), Take  by mouth., Disp: , Rfl:   •  cyanocobalamin (VITAMIN B12) 1000 MCG TABS, Take 1 Tab by mouth every day. (Patient not taking: Reported on 3/15/2019), Disp: 30 Tab, Rfl: 0    Patient was instructed on the use of medications, either prescriptions or OTC and informed on when the appropriate follow up time period should be. In addition, patient was also instructed that should any acute worsening occur that they should notify this  clinic asap or call 911.        Services suggested: No services needed at this time  Health Care Screening recommendations as per orders if indicated.  Referrals offered: PT/OT/Nutrition counseling/Behavioral Health/Smoking cessation as per orders if indicated.    Discussion today about general wellness and lifestyle habits:    · Prevent falls and reduce trip hazards; Cautioned about securing or removing rugs.  · Have a working fire alarm and carbon monoxide detector;   · Engage in regular physical activity and social activities       Follow-up: No follow-ups on file.

## 2020-09-24 ENCOUNTER — OFFICE VISIT (OUTPATIENT)
Dept: CARDIOLOGY | Facility: MEDICAL CENTER | Age: 70
End: 2020-09-24
Payer: MEDICARE

## 2020-09-24 VITALS
HEIGHT: 73 IN | BODY MASS INDEX: 30.19 KG/M2 | DIASTOLIC BLOOD PRESSURE: 76 MMHG | WEIGHT: 227.8 LBS | OXYGEN SATURATION: 97 % | SYSTOLIC BLOOD PRESSURE: 182 MMHG | HEART RATE: 52 BPM

## 2020-09-24 DIAGNOSIS — I10 BENIGN ESSENTIAL HTN: ICD-10-CM

## 2020-09-24 DIAGNOSIS — G47.33 OBSTRUCTIVE SLEEP APNEA: ICD-10-CM

## 2020-09-24 DIAGNOSIS — F41.9 ANXIETY: ICD-10-CM

## 2020-09-24 DIAGNOSIS — Z95.1 S/P CABG X 3: ICD-10-CM

## 2020-09-24 DIAGNOSIS — I25.111 CORONARY ARTERY DISEASE INVOLVING NATIVE CORONARY ARTERY OF NATIVE HEART WITH ANGINA PECTORIS WITH DOCUMENTED SPASM (HCC): ICD-10-CM

## 2020-09-24 PROCEDURE — 99214 OFFICE O/P EST MOD 30 MIN: CPT | Performed by: INTERNAL MEDICINE

## 2020-09-24 RX ORDER — ALPRAZOLAM 0.5 MG/1
0.5 TABLET ORAL PRN
Qty: 30 TAB | Refills: 1 | Status: SHIPPED | OUTPATIENT
Start: 2020-09-24 | End: 2020-11-23

## 2020-09-24 ASSESSMENT — FIBROSIS 4 INDEX: FIB4 SCORE: 1.44

## 2020-09-24 NOTE — PROGRESS NOTES
Subjective:   Deniz Julio is a 70y.o. male who presents today for followup of ischemic cardiomyopathy with recovered EF and CAD with three-vessel CAD status post CABG on 2/16/15 (3 vessels), CKD, postoperative DVT. He is feeling well, NYHA class I, walks 3 miles per day and continues to lose significant amounts of weight due to diet and exercise. His ejection fraction has normalized. Tolerating medical therapy.     Since last visit he has been taking his medications intermittently based on what his blood pressure is during the day.  This has resulted in poorly controlled blood pressures and hypertensive emergency resulting in an ER visit.  He also has severe anxiety related to any high blood pressure readings he obtains.  This is corroborated by his family member who is present.    Past Surgical History:   Procedure Laterality Date   • MULTIPLE CORONARY ARTERY BYPASS ENDO VEIN HARVEST  2/16/2015    Performed by Ino Culver M.D. at SURGERY [facility] ORS   • TONSILLECTOMY       Family History   Problem Relation Age of Onset   • Heart Disease Mother    • Hypertension Mother    • Heart Disease Father    • Other Father         GI Bleeding   • Hypertension Father      Social History     Tobacco Use   Smoking Status Former Smoker   • Types: Cigarettes   Smokeless Tobacco Never Used     Allergies   Allergen Reactions   • Tetracycline      Unknown rxn.   • Tripelennamine      Unknown rxn.     Outpatient Encounter Medications as of 9/24/2020   Medication Sig Dispense Refill   • ALPRAZolam (XANAX) 0.5 MG Tab Take 1 Tab by mouth as needed for Sleep (anxiety) for up to 60 days. 30 Tab 1   • atorvastatin (LIPITOR) 40 MG Tab Take 1 Tab by mouth every day. 90 Tab 3   • carvedilol (COREG) 6.25 MG Tab Take 1 Tab by mouth 2 times a day, with meals. 180 Tab 3   • lisinopril-hydrochlorothiazide (PRINZIDE) 20-25 MG per tablet Take 1 Tab by mouth every day. TAKE ONE TABLET BY MOUTH DAILY 90 Tab 3   • Multiple Minerals-Vitamins  "(PAULA-MAG-ZINC-D PO) Take  by mouth.     • BETA CAROTENE PO Take  by mouth.     • Coenzyme Q10 (CO Q 10 PO) Take  by mouth.     • aspirin 81 MG EC tablet Take 1 Tab by mouth every day. 30 Tab    • ezetimibe (ZETIA) 10 MG Tab Take 1 Tab by mouth every day. (Patient not taking: Reported on 9/24/2020) 90 Tab 3   • cyanocobalamin (VITAMIN B12) 1000 MCG TABS Take 1 Tab by mouth every day. (Patient not taking: Reported on 3/15/2019) 30 Tab 0     No facility-administered encounter medications on file as of 9/24/2020.      Review of Systems   All other systems reviewed and are negative.       Objective:   BP (!) 182/76 (BP Location: Left arm, Patient Position: Sitting, BP Cuff Size: Adult)   Pulse (!) 52   Ht 1.854 m (6' 1\")   Wt 103.3 kg (227 lb 12.8 oz)   SpO2 97%   BMI 30.05 kg/m²     Physical Exam   Constitutional: He is oriented to person, place, and time. He appears well-developed and well-nourished. No distress.   Continued weight loss.  Remains overweight   HENT:   Head: Normocephalic and atraumatic.   Mouth/Throat: Oropharynx is clear and moist.   Eyes: Pupils are equal, round, and reactive to light. Conjunctivae are normal. No scleral icterus.   Neck: No JVD present. No tracheal deviation present. No thyromegaly present.   Cardiovascular: Normal rate, regular rhythm, S1 normal, normal heart sounds and intact distal pulses. PMI is not displaced. Exam reveals no gallop and no friction rub.   No murmur heard.  Pulses:       Carotid pulses are 2+ on the right side and 2+ on the left side.       Radial pulses are 2+ on the right side and 2+ on the left side.        Dorsalis pedis pulses are 2+ on the right side and 2+ on the left side.        Posterior tibial pulses are 2+ on the right side and 2+ on the left side.   Pulmonary/Chest: Effort normal and breath sounds normal. He has no wheezes. He has no rales.   Well-healed surgical incision, midline   Abdominal: Soft. Bowel sounds are normal. He exhibits no " distension and no pulsatile midline mass. There is no abdominal tenderness. There is no guarding.   Musculoskeletal:         General: No edema.   Neurological: He is alert and oriented to person, place, and time. No cranial nerve deficit.   Skin: Skin is warm and dry. No rash noted. He is not diaphoretic. No erythema.   Psychiatric: He has a normal mood and affect. His behavior is normal. Thought content normal.     LABS:  Lab Results   Component Value Date/Time    CHOLSTRLTOT 159 08/27/2019 07:35 AM    LDL 91 08/27/2019 07:35 AM    HDL 41 08/27/2019 07:35 AM    TRIGLYCERIDE 136 08/27/2019 07:35 AM       Lab Results   Component Value Date/Time    WBC 6.1 08/27/2019 07:35 AM    RBC 4.58 (L) 08/27/2019 07:35 AM    HEMOGLOBIN 14.7 08/27/2019 07:35 AM    HEMATOCRIT 45.2 08/27/2019 07:35 AM    MCV 98.7 (H) 08/27/2019 07:35 AM    NEUTSPOLYS 36.7 (L) 03/16/2015 11:40 AM    LYMPHOCYTES 47.6 (H) 03/16/2015 11:40 AM    MONOCYTES 9.4 03/16/2015 11:40 AM    EOSINOPHILS 4.9 03/16/2015 11:40 AM    BASOPHILS 1.3 03/16/2015 11:40 AM     Lab Results   Component Value Date/Time    SODIUM 136 08/27/2019 07:35 AM    POTASSIUM 5.0 08/27/2019 07:35 AM    CHLORIDE 106 08/27/2019 07:35 AM    CO2 26 08/27/2019 07:35 AM    GLUCOSE 99 08/27/2019 07:35 AM    BUN 32 (H) 08/27/2019 07:35 AM    CREATININE 1.37 08/27/2019 07:35 AM         Lab Results   Component Value Date/Time    ALKPHOSPHAT 51 08/27/2019 07:35 AM    ASTSGOT 13 08/27/2019 07:35 AM    ALTSGPT 17 08/27/2019 07:35 AM    TBILIRUBIN 0.9 08/27/2019 07:35 AM      Lab Results   Component Value Date/Time    BNPBTYPENAT 1182 (H) 03/13/2015 11:15 AM      No results found for: TSH  Lab Results   Component Value Date/Time    PROTHROMBTM 23.4 (H) 03/16/2015 11:40 AM    INR 2.2 12/29/2015          ECHO CONCLUSIONS (7/9/2015):  Normal left ventricular chamber size.  Normal left ventricular systolic function.  Left ventricular ejection fraction is 55% to 60%.  Grade II diastolic dysfunction -  pseudonormal mitral inflow is   observed.    Compared to prior study of 2-6-2015 -- LVEF is now normal and diastolic   function has improved.      Assessment:     1. CAD s/p CABG with recovered LVEF     2. S/P CABG x 3     3. Obstructive sleep apnea     4. Benign essential HTN     5. Anxiety  ALPRAZolam (XANAX) 0.5 MG Tab       Medical Decision Making:  Today's Assessment / Status / Plan:     He has discontinued his CPAP again which was helping with his blood pressures.  His blood pressures are now poorly controlled.  This in combination with his intermittent dosing based on what his blood pressure is on a every day or multiple times per day schedule is causing labile blood pressures.  I recommend reinstitution of CPAP.  Also recommend that we cut his dose of lisinopril/HCTZ in half and he take it every day regardless of his blood pressure as long as he is not having any symptoms.  I recommend he check his blood pressure only 3 times per week.  Should he note a very high reading and feel anxious about it I recommend he try a Xanax.  If this is effective then it is anxiety driven, if it is ineffective then we should uptitrate his blood pressure medications further.  He will follow-up closely and be in contact over the phone.  He is retired pharmacist.

## 2020-11-11 DIAGNOSIS — I10 BENIGN ESSENTIAL HTN: ICD-10-CM

## 2020-11-11 DIAGNOSIS — E78.5 DYSLIPIDEMIA: ICD-10-CM

## 2020-11-11 DIAGNOSIS — I25.111 CORONARY ARTERY DISEASE INVOLVING NATIVE CORONARY ARTERY OF NATIVE HEART WITH ANGINA PECTORIS WITH DOCUMENTED SPASM (HCC): ICD-10-CM

## 2020-11-11 DIAGNOSIS — I25.10 CORONARY ARTERY DISEASE INVOLVING NATIVE CORONARY ARTERY OF NATIVE HEART WITHOUT ANGINA PECTORIS: ICD-10-CM

## 2020-11-12 DIAGNOSIS — I10 BENIGN ESSENTIAL HTN: ICD-10-CM

## 2020-11-12 DIAGNOSIS — I25.111 CORONARY ARTERY DISEASE INVOLVING NATIVE CORONARY ARTERY OF NATIVE HEART WITH ANGINA PECTORIS WITH DOCUMENTED SPASM (HCC): ICD-10-CM

## 2020-11-12 DIAGNOSIS — I25.10 CORONARY ARTERY DISEASE INVOLVING NATIVE CORONARY ARTERY OF NATIVE HEART WITHOUT ANGINA PECTORIS: ICD-10-CM

## 2020-11-12 DIAGNOSIS — E78.5 DYSLIPIDEMIA: ICD-10-CM

## 2020-11-12 RX ORDER — LISINOPRIL AND HYDROCHLOROTHIAZIDE 25; 20 MG/1; MG/1
1 TABLET ORAL DAILY
Qty: 90 TAB | Refills: 1 | Status: SHIPPED | OUTPATIENT
Start: 2020-11-12 | End: 2021-06-01

## 2020-11-12 RX ORDER — LISINOPRIL AND HYDROCHLOROTHIAZIDE 25; 20 MG/1; MG/1
0.5 TABLET ORAL DAILY
Qty: 45 TAB | Refills: 1 | Status: SHIPPED | OUTPATIENT
Start: 2020-11-12 | End: 2020-11-12 | Stop reason: SDUPTHER

## 2020-11-12 NOTE — TELEPHONE ENCOUNTER
Per TW last OV notes 09/24/20:    Medical Decision Making:  Today's Assessment / Status / Plan:      He has discontinued his CPAP again which was helping with his blood pressures.  His blood pressures are now poorly controlled.  This in combination with his intermittent dosing based on what his blood pressure is on a every day or multiple times per day schedule is causing labile blood pressures.  I recommend reinstitution of CPAP.  Also recommend that we cut his dose of lisinopril/HCTZ in half and he take it every day regardless of his blood pressure as long as he is not having any symptoms.  I recommend he check his blood pressure only 3 times per week.

## 2021-03-05 DIAGNOSIS — E78.5 DYSLIPIDEMIA: ICD-10-CM

## 2021-03-05 DIAGNOSIS — I25.10 CORONARY ARTERY DISEASE INVOLVING NATIVE CORONARY ARTERY OF NATIVE HEART WITHOUT ANGINA PECTORIS: ICD-10-CM

## 2021-03-05 DIAGNOSIS — I25.111 CORONARY ARTERY DISEASE INVOLVING NATIVE CORONARY ARTERY OF NATIVE HEART WITH ANGINA PECTORIS WITH DOCUMENTED SPASM (HCC): ICD-10-CM

## 2021-03-05 DIAGNOSIS — I10 BENIGN ESSENTIAL HTN: ICD-10-CM

## 2021-03-05 RX ORDER — CARVEDILOL 6.25 MG/1
TABLET ORAL
Qty: 180 TABLET | Refills: 2 | Status: SHIPPED | OUTPATIENT
Start: 2021-03-05 | End: 2021-11-30 | Stop reason: SDUPTHER

## 2021-03-17 DIAGNOSIS — E78.5 DYSLIPIDEMIA: ICD-10-CM

## 2021-03-17 DIAGNOSIS — I25.10 CORONARY ARTERY DISEASE INVOLVING NATIVE CORONARY ARTERY OF NATIVE HEART WITHOUT ANGINA PECTORIS: ICD-10-CM

## 2021-03-17 DIAGNOSIS — I10 BENIGN ESSENTIAL HTN: ICD-10-CM

## 2021-03-17 DIAGNOSIS — I25.111 CORONARY ARTERY DISEASE INVOLVING NATIVE CORONARY ARTERY OF NATIVE HEART WITH ANGINA PECTORIS WITH DOCUMENTED SPASM (HCC): ICD-10-CM

## 2021-03-18 RX ORDER — ATORVASTATIN CALCIUM 40 MG/1
TABLET, FILM COATED ORAL
Qty: 90 TABLET | Refills: 2 | Status: SHIPPED
Start: 2021-03-18 | End: 2021-11-30

## 2021-03-26 ENCOUNTER — OFFICE VISIT (OUTPATIENT)
Dept: CARDIOLOGY | Facility: MEDICAL CENTER | Age: 71
End: 2021-03-26
Payer: MEDICARE

## 2021-03-26 VITALS
HEIGHT: 73 IN | OXYGEN SATURATION: 97 % | SYSTOLIC BLOOD PRESSURE: 140 MMHG | WEIGHT: 232.6 LBS | BODY MASS INDEX: 30.83 KG/M2 | RESPIRATION RATE: 16 BRPM | HEART RATE: 67 BPM | DIASTOLIC BLOOD PRESSURE: 84 MMHG

## 2021-03-26 DIAGNOSIS — E78.5 DYSLIPIDEMIA: ICD-10-CM

## 2021-03-26 DIAGNOSIS — I25.111 CORONARY ARTERY DISEASE INVOLVING NATIVE CORONARY ARTERY OF NATIVE HEART WITH ANGINA PECTORIS WITH DOCUMENTED SPASM (HCC): ICD-10-CM

## 2021-03-26 DIAGNOSIS — Z95.1 S/P CABG X 3: ICD-10-CM

## 2021-03-26 PROCEDURE — 99213 OFFICE O/P EST LOW 20 MIN: CPT | Performed by: INTERNAL MEDICINE

## 2021-03-26 ASSESSMENT — FIBROSIS 4 INDEX: FIB4 SCORE: 1.46

## 2021-03-26 NOTE — PROGRESS NOTES
Subjective:   Deniz Julio is a 70y.o. male who presents today for followup of ischemic cardiomyopathy with recovered EF and CAD with three-vessel CAD status post CABG on 2/16/15 (3 vessels), CKD, postoperative DVT. He is feeling well, NYHA class I, walks 3 miles per day and continues to lose significant amounts of weight due to diet and exercise. His ejection fraction has normalized. Tolerating medical therapy.     Since last visit he has been taking his medications more regularly and found his blood pressures are better controlled.  Complains mostly of frequent nocturnal awakening.  He cannot tolerate his CPAP mask.  He has gained weight due to inability to get to the gym as much.    Past Surgical History:   Procedure Laterality Date   • MULTIPLE CORONARY ARTERY BYPASS ENDO VEIN HARVEST  2/16/2015    Performed by Ino Culver M.D. at SURGERY [facility]   • TONSILLECTOMY       Family History   Problem Relation Age of Onset   • Heart Disease Mother    • Hypertension Mother    • Heart Disease Father    • Other Father         GI Bleeding   • Hypertension Father      Social History     Tobacco Use   Smoking Status Former Smoker   • Types: Cigarettes   Smokeless Tobacco Never Used     Allergies   Allergen Reactions   • Tetracycline      Unknown rxn.   • Tripelennamine      Unknown rxn.     Outpatient Encounter Medications as of 3/26/2021   Medication Sig Dispense Refill   • atorvastatin (LIPITOR) 40 MG Tab TAKE ONE TABLET BY MOUTH DAILY ---LIPITOR 90 tablet 2   • carvedilol (COREG) 6.25 MG Tab TAKE ONE TABLET BY MOUTH TWICE A DAY WITH MEALS --COREG 180 tablet 2   • lisinopril-hydrochlorothiazide (PRINZIDE) 20-25 MG per tablet Take 1 Tab by mouth every day. 90 Tab 1   • Multiple Minerals-Vitamins (PAULA-MAG-ZINC-D PO) Take  by mouth.     • BETA CAROTENE PO Take  by mouth.     • Coenzyme Q10 (CO Q 10 PO) Take  by mouth.     • aspirin 81 MG EC tablet Take 1 Tab by mouth every day. 30 Tab    • ezetimibe (ZETIA) 10 MG  "Tab Take 1 Tab by mouth every day. (Patient not taking: Reported on 9/24/2020) 90 Tab 3   • cyanocobalamin (VITAMIN B12) 1000 MCG TABS Take 1 Tab by mouth every day. (Patient not taking: Reported on 3/26/2021) 30 Tab 0     No facility-administered encounter medications on file as of 3/26/2021.     Review of Systems   All other systems reviewed and are negative.       Objective:   /84 (BP Location: Left arm, Patient Position: Sitting, BP Cuff Size: Adult)   Pulse 67   Resp 16   Ht 1.854 m (6' 1\")   Wt 106 kg (232 lb 9.6 oz)   SpO2 97%   BMI 30.69 kg/m²     Physical Exam   Constitutional: He is oriented to person, place, and time. He appears well-developed and well-nourished. No distress.   Continued weight loss.  Remains overweight   HENT:   Head: Normocephalic and atraumatic.   Mouth/Throat: Oropharynx is clear and moist.   Eyes: Pupils are equal, round, and reactive to light. Conjunctivae are normal. No scleral icterus.   Neck: No JVD present. No tracheal deviation present. No thyromegaly present.   Cardiovascular: Normal rate, regular rhythm, S1 normal, normal heart sounds and intact distal pulses. PMI is not displaced. Exam reveals no gallop and no friction rub.   No murmur heard.  Pulses:       Carotid pulses are 2+ on the right side and 2+ on the left side.       Radial pulses are 2+ on the right side and 2+ on the left side.        Dorsalis pedis pulses are 2+ on the right side and 2+ on the left side.        Posterior tibial pulses are 2+ on the right side and 2+ on the left side.   Pulmonary/Chest: Effort normal and breath sounds normal. He has no wheezes. He has no rales.   Well-healed surgical incision, midline   Abdominal: Soft. Bowel sounds are normal. He exhibits no distension and no pulsatile midline mass. There is no abdominal tenderness. There is no guarding.   Musculoskeletal:         General: No edema.   Neurological: He is alert and oriented to person, place, and time. No cranial nerve " deficit.   Skin: Skin is warm and dry. No rash noted. He is not diaphoretic. No erythema.   Psychiatric: He has a normal mood and affect. His behavior is normal. Thought content normal.     LABS:  Lab Results   Component Value Date/Time    CHOLSTRLTOT 159 08/27/2019 07:35 AM    LDL 91 08/27/2019 07:35 AM    HDL 41 08/27/2019 07:35 AM    TRIGLYCERIDE 136 08/27/2019 07:35 AM       Lab Results   Component Value Date/Time    WBC 6.1 08/27/2019 07:35 AM    RBC 4.58 (L) 08/27/2019 07:35 AM    HEMOGLOBIN 14.7 08/27/2019 07:35 AM    HEMATOCRIT 45.2 08/27/2019 07:35 AM    MCV 98.7 (H) 08/27/2019 07:35 AM    NEUTSPOLYS 36.7 (L) 03/16/2015 11:40 AM    LYMPHOCYTES 47.6 (H) 03/16/2015 11:40 AM    MONOCYTES 9.4 03/16/2015 11:40 AM    EOSINOPHILS 4.9 03/16/2015 11:40 AM    BASOPHILS 1.3 03/16/2015 11:40 AM     Lab Results   Component Value Date/Time    SODIUM 136 08/27/2019 07:35 AM    POTASSIUM 5.0 08/27/2019 07:35 AM    CHLORIDE 106 08/27/2019 07:35 AM    CO2 26 08/27/2019 07:35 AM    GLUCOSE 99 08/27/2019 07:35 AM    BUN 32 (H) 08/27/2019 07:35 AM    CREATININE 1.37 08/27/2019 07:35 AM         Lab Results   Component Value Date/Time    ALKPHOSPHAT 51 08/27/2019 07:35 AM    ASTSGOT 13 08/27/2019 07:35 AM    ALTSGPT 17 08/27/2019 07:35 AM    TBILIRUBIN 0.9 08/27/2019 07:35 AM      Lab Results   Component Value Date/Time    BNPBTYPENAT 1182 (H) 03/13/2015 11:15 AM      No results found for: TSH  Lab Results   Component Value Date/Time    PROTHROMBTM 23.4 (H) 03/16/2015 11:40 AM    INR 2.2 12/29/2015 12:00 AM          ECHO CONCLUSIONS (7/9/2015):  Normal left ventricular chamber size.  Normal left ventricular systolic function.  Left ventricular ejection fraction is 55% to 60%.  Grade II diastolic dysfunction - pseudonormal mitral inflow is   observed.    Compared to prior study of 2-6-2015 -- LVEF is now normal and diastolic   function has improved.      Assessment:     1. Coronary artery disease involving native coronary artery  of native heart with angina pectoris with documented spasm (HCC)  Comp Metabolic Panel    Lipid Profile   2. S/P CABG x 3  Comp Metabolic Panel    Lipid Profile   3. Dyslipidemia         Medical Decision Making:  Today's Assessment / Status / Plan:     He is sleep-related issues are likely at least partially related to his untreated sleep apnea.  We discussed long-acting melatonin as he is currently taking short acting melatonin.  He is due for lipid profile with goal LDL less than 70 metabolic panel as well.  Continue other medical therapy for the time being.  Recommend increase physical activity diet and weight loss.    Follow-up 6 months

## 2021-04-05 ENCOUNTER — OFFICE VISIT (OUTPATIENT)
Dept: MEDICAL GROUP | Facility: PHYSICIAN GROUP | Age: 71
End: 2021-04-05
Payer: MEDICARE

## 2021-04-05 VITALS
HEIGHT: 73 IN | HEART RATE: 74 BPM | BODY MASS INDEX: 30.63 KG/M2 | TEMPERATURE: 97.5 F | WEIGHT: 231.1 LBS | SYSTOLIC BLOOD PRESSURE: 126 MMHG | DIASTOLIC BLOOD PRESSURE: 70 MMHG | RESPIRATION RATE: 20 BRPM | OXYGEN SATURATION: 99 %

## 2021-04-05 DIAGNOSIS — Z00.00 MEDICARE ANNUAL WELLNESS VISIT, SUBSEQUENT: ICD-10-CM

## 2021-04-05 DIAGNOSIS — E78.5 DYSLIPIDEMIA: ICD-10-CM

## 2021-04-05 DIAGNOSIS — Z95.1 S/P CABG X 3: ICD-10-CM

## 2021-04-05 DIAGNOSIS — I25.10 CORONARY ARTERY DISEASE INVOLVING NATIVE CORONARY ARTERY OF NATIVE HEART WITHOUT ANGINA PECTORIS: ICD-10-CM

## 2021-04-05 DIAGNOSIS — I10 BENIGN ESSENTIAL HTN: ICD-10-CM

## 2021-04-05 PROCEDURE — G0439 PPPS, SUBSEQ VISIT: HCPCS | Performed by: FAMILY MEDICINE

## 2021-04-05 ASSESSMENT — ENCOUNTER SYMPTOMS: GENERAL WELL-BEING: GOOD

## 2021-04-05 ASSESSMENT — PATIENT HEALTH QUESTIONNAIRE - PHQ9: CLINICAL INTERPRETATION OF PHQ2 SCORE: 0

## 2021-04-05 ASSESSMENT — ACTIVITIES OF DAILY LIVING (ADL): BATHING_REQUIRES_ASSISTANCE: 0

## 2021-04-05 ASSESSMENT — FIBROSIS 4 INDEX: FIB4 SCORE: 1.46

## 2021-04-05 NOTE — PROGRESS NOTES
No chief complaint on file.      HPI:  Deniz Julio is a 71 y.o. here for Medicare Annual Wellness Visit     Patient Active Problem List    Diagnosis Date Noted   • S/P CABG x 3 02/23/2015   • Coronary artery disease involving native coronary artery of native heart without angina pectoris 02/23/2015   • Dyslipidemia 02/23/2015   • Chronic kidney disease 02/23/2015   • Benign essential HTN 01/05/2017   • Obstructive sleep apnea 08/22/2016       Current Outpatient Medications   Medication Sig Dispense Refill   • atorvastatin (LIPITOR) 40 MG Tab TAKE ONE TABLET BY MOUTH DAILY ---LIPITOR 90 tablet 2   • carvedilol (COREG) 6.25 MG Tab TAKE ONE TABLET BY MOUTH TWICE A DAY WITH MEALS --COREG 180 tablet 2   • lisinopril-hydrochlorothiazide (PRINZIDE) 20-25 MG per tablet Take 1 Tab by mouth every day. 90 Tab 1   • Multiple Minerals-Vitamins (PAULA-MAG-ZINC-D PO) Take  by mouth.     • BETA CAROTENE PO Take  by mouth.     • Coenzyme Q10 (CO Q 10 PO) Take  by mouth.     • aspirin 81 MG EC tablet Take 1 Tab by mouth every day. 30 Tab      No current facility-administered medications for this visit.          Current supplements as per medication list.     Allergies: Tetracycline and Tripelennamine    Current social contact/activities:      He  reports that he has quit smoking. His smoking use included cigarettes. He has never used smokeless tobacco. He reports current alcohol use. He reports that he does not use drugs.  Counseling given: Not Answered      DPA/Advanced Directive:  Patient has Advanced Directive, but it is not on file. Instructed to bring in a copy to scan into their chart.    ROS:    Gait: Uses no assistive device  Ostomy: No  Other tubes: No  Amputations: No  Chronic oxygen use: No  Last eye exam: 2017  Wears hearing aids: No   : Denies any urinary leakage during the last 6 months    Screening:    Depression Screening    Little interest or pleasure in doing things?  0 - not at all  Feeling down, depressed , or  hopeless? 0 - not at all  Patient Health Questionnaire Score: 0     If depressive symptoms identified deferred to follow up visit unless specifically addressed in assessment and plan.    Interpretation of PHQ-9 Total Score   Score Severity   1-4 No Depression   5-9 Mild Depression   10-14 Moderate Depression   15-19 Moderately Severe Depression   20-27 Severe Depression    Screening for Cognitive Impairment    Three Minute Recall (captain, garden, picture) 3/3    Spencer clock face with all 12 numbers and set the hands to show 5 past 8.  Yes    Cognitive concerns identified deferred for follow up unless specifically addressed in assessment and plan.    Fall Risk Assessment    Has the patient had two or more falls in the last year or any fall with injury in the last year?  No    Safety Assessment    Throw rugs on floor.  Yes  Handrails on all stairs.  Yes  Good lighting in all hallways.  Yes  Difficulty hearing.  No  Patient counseled about all safety risks that were identified.    Functional Assessment ADLs    Are there any barriers preventing you from cooking for yourself or meeting nutritional needs?  No.    Are there any barriers preventing you from driving safely or obtaining transportation?  No.    Are there any barriers preventing you from using a telephone or calling for help?  No.    Are there any barriers preventing you from shopping?  No.    Are there any barriers preventing you from taking care of your own finances?  No.    Are there any barriers preventing you from managing your medications?  No.    Are there any barriers preventing you from showering, bathing or dressing yourself?  No.    Are you currently engaging in any exercise or physical activity?  No.     What is your perception of your health?  Good.      Health Maintenance Summary                HEPATITIS C SCREENING Overdue 1950     IMM DTaP/Tdap/Td Vaccine Overdue 1/5/1967     IMM ZOSTER VACCINES Overdue 1/5/1998     Annual Wellness Visit  "Overdue 3/4/2021      Done 3/3/2020      Patient has more history with this topic...    IMM INFLUENZA Next Due 9/1/2021      Done 1/5/2017 Imm Admin: Influenza Vaccine Adult HD    COLONOSCOPY Next Due 3/13/2025      Patient Declined 3/13/2015           Patient Care Team:  Joshua Fu M.D. as PCP - General (Family Medicine)  Bennett as Respiratory Therapist        Social History     Tobacco Use   • Smoking status: Former Smoker     Types: Cigarettes   • Smokeless tobacco: Never Used   Substance Use Topics   • Alcohol use: Yes     Alcohol/week: 0.0 oz     Comment: Wine occ   • Drug use: No     Family History   Problem Relation Age of Onset   • Heart Disease Mother    • Hypertension Mother    • Heart Disease Father    • Other Father         GI Bleeding   • Hypertension Father      He  has a past medical history of CAD (coronary artery disease), native coronary artery, Chronic kidney disease, DVT (deep venous thrombosis) (HCC), Hypertension, Obesity, and HEYDI (obstructive sleep apnea).   Past Surgical History:   Procedure Laterality Date   • MULTIPLE CORONARY ARTERY BYPASS ENDO VEIN HARVEST  2/16/2015    Performed by Ino Culver M.D. at SURGERY [facility]   • TONSILLECTOMY         Exam:   /70   Pulse 74   Temp 36.4 °C (97.5 °F) (Temporal)   Resp 20   Ht 1.854 m (6' 1\")   Wt 105 kg (231 lb 1.6 oz)   SpO2 99%  Body mass index is 30.49 kg/m².    Hearing good.    Dentition fair  Alert, oriented in no acute distress.  Eye contact is good, speech goal directed, affect calm    Assessment and Plan. The following treatment and monitoring plan is recommended:      1. Medicare annual wellness visit, subsequent      2. Coronary artery disease involving native coronary artery of native heart without angina pectoris  The patient's current medical issue is well controlled on the current therapy with no new symptoms or worsening      3. Benign essential HTN  Currently treated for HTN, taking meds with no CP or " sob, monitors bp at home periodically. controlled        4. Dyslipidemia  Currently treated for HLD, taking meds with no new myalgias or joint pain, watching fats in diet  controlled        5. S/P CABG x 3  On statin and thinner    Past Medical History:   Diagnosis Date   • CAD (coronary artery disease), native coronary artery    • Chronic kidney disease    • DVT (deep venous thrombosis) (HCC)     had dvt after harvest of thigh vein, goes to coumadin clini   • Hypertension    • Obesity    • HEYDI (obstructive sleep apnea)      Past Surgical History:   Procedure Laterality Date   • MULTIPLE CORONARY ARTERY BYPASS ENDO VEIN HARVEST  2/16/2015    Performed by Ino Culver M.D. at SURGERY [facility]   • TONSILLECTOMY       Social History     Tobacco Use   • Smoking status: Former Smoker     Types: Cigarettes   • Smokeless tobacco: Never Used   Substance Use Topics   • Alcohol use: Yes     Alcohol/week: 0.0 oz     Comment: Wine occ   • Drug use: No     Family History   Problem Relation Age of Onset   • Heart Disease Mother    • Hypertension Mother    • Heart Disease Father    • Other Father         GI Bleeding   • Hypertension Father          Current Outpatient Medications:   •  atorvastatin (LIPITOR) 40 MG Tab, TAKE ONE TABLET BY MOUTH DAILY ---LIPITOR, Disp: 90 tablet, Rfl: 2  •  carvedilol (COREG) 6.25 MG Tab, TAKE ONE TABLET BY MOUTH TWICE A DAY WITH MEALS --COREG, Disp: 180 tablet, Rfl: 2  •  lisinopril-hydrochlorothiazide (PRINZIDE) 20-25 MG per tablet, Take 1 Tab by mouth every day., Disp: 90 Tab, Rfl: 1  •  Multiple Minerals-Vitamins (PAULA-MAG-ZINC-D PO), Take  by mouth., Disp: , Rfl:   •  BETA CAROTENE PO, Take  by mouth., Disp: , Rfl:   •  Coenzyme Q10 (CO Q 10 PO), Take  by mouth., Disp: , Rfl:   •  aspirin 81 MG EC tablet, Take 1 Tab by mouth every day., Disp: 30 Tab, Rfl:     Patient was instructed on the use of medications, either prescriptions or OTC and informed on when the appropriate follow up time  period should be. In addition, patient was also instructed that should any acute worsening occur that they should notify this clinic asap or call 911.        Services suggested: No services needed at this time  Health Care Screening: Age-appropriate preventive services recommended by USPTF and ACIP covered by Medicare were discussed today. Services ordered if indicated and agreed upon by the patient.  Referrals offered: Community-based lifestyle interventions to reduce health risks and promote self-management and wellness, fall prevention, nutrition, physical activity, tobacco-use cessation, weight loss, and mental health services as per orders if indicated.    Discussion today about general wellness and lifestyle habits:    · Prevent falls and reduce trip hazards; Cautioned about securing or removing rugs.  · Have a working fire alarm and carbon monoxide detector;   · Engage in regular physical activity and social activities     Follow-up: No follow-ups on file.

## 2021-05-28 DIAGNOSIS — I25.111 CORONARY ARTERY DISEASE INVOLVING NATIVE CORONARY ARTERY OF NATIVE HEART WITH ANGINA PECTORIS WITH DOCUMENTED SPASM (HCC): ICD-10-CM

## 2021-05-28 DIAGNOSIS — E78.5 DYSLIPIDEMIA: ICD-10-CM

## 2021-05-28 DIAGNOSIS — I10 BENIGN ESSENTIAL HTN: ICD-10-CM

## 2021-05-28 DIAGNOSIS — I25.10 CORONARY ARTERY DISEASE INVOLVING NATIVE CORONARY ARTERY OF NATIVE HEART WITHOUT ANGINA PECTORIS: ICD-10-CM

## 2021-06-01 RX ORDER — LISINOPRIL AND HYDROCHLOROTHIAZIDE 25; 20 MG/1; MG/1
TABLET ORAL
Qty: 90 TABLET | Refills: 1 | Status: SHIPPED | OUTPATIENT
Start: 2021-06-01 | End: 2021-11-30 | Stop reason: SDUPTHER

## 2021-09-15 ENCOUNTER — TELEPHONE (OUTPATIENT)
Dept: CARDIOLOGY | Facility: MEDICAL CENTER | Age: 71
End: 2021-09-15

## 2021-09-15 NOTE — TELEPHONE ENCOUNTER
Tried to call patient to see if he did labs yet. Wife answered the phone stating she is at work and he is at home. Was not sure if he did labs or not and would have him call to let us know.

## 2021-09-15 NOTE — TELEPHONE ENCOUNTER
Called patient back and left a detailed message asking if he did the labwork yet and if so where so that I can call to request results and if not to go ahaed and get those done so that Dr Becker has report for his follow up on 9/24/2021.

## 2021-11-17 ENCOUNTER — TELEPHONE (OUTPATIENT)
Dept: CARDIOLOGY | Facility: MEDICAL CENTER | Age: 71
End: 2021-11-17

## 2021-11-17 NOTE — TELEPHONE ENCOUNTER
Pt returning call and states that he will be going to a [facility] on Monday 11/22. Please call pt back if you have any further questions or concerns at [phone]    Thank you.

## 2021-11-17 NOTE — TELEPHONE ENCOUNTER
RUTM asking if patient did labs and if so where so that I can request them for his follow up appointment with Dr. Becker on 11/30/2021.

## 2021-11-22 ENCOUNTER — HOSPITAL ENCOUNTER (OUTPATIENT)
Dept: LAB | Facility: MEDICAL CENTER | Age: 71
End: 2021-11-22
Attending: INTERNAL MEDICINE
Payer: MEDICARE

## 2021-11-22 DIAGNOSIS — I25.111 CORONARY ARTERY DISEASE INVOLVING NATIVE CORONARY ARTERY OF NATIVE HEART WITH ANGINA PECTORIS WITH DOCUMENTED SPASM (HCC): ICD-10-CM

## 2021-11-22 DIAGNOSIS — Z95.1 S/P CABG X 3: ICD-10-CM

## 2021-11-22 LAB
ALBUMIN SERPL BCP-MCNC: 4.7 G/DL (ref 3.2–4.9)
ALBUMIN/GLOB SERPL: 1.7 G/DL
ALP SERPL-CCNC: 75 U/L (ref 30–99)
ALT SERPL-CCNC: 16 U/L (ref 2–50)
ANION GAP SERPL CALC-SCNC: 10 MMOL/L (ref 7–16)
AST SERPL-CCNC: 12 U/L (ref 12–45)
BILIRUB SERPL-MCNC: 0.6 MG/DL (ref 0.1–1.5)
BUN SERPL-MCNC: 38 MG/DL (ref 8–22)
CALCIUM SERPL-MCNC: 9.5 MG/DL (ref 8.5–10.5)
CHLORIDE SERPL-SCNC: 110 MMOL/L (ref 96–112)
CHOLEST SERPL-MCNC: 155 MG/DL (ref 100–199)
CO2 SERPL-SCNC: 22 MMOL/L (ref 20–33)
CREAT SERPL-MCNC: 1.39 MG/DL (ref 0.5–1.4)
FASTING STATUS PATIENT QL REPORTED: NORMAL
GLOBULIN SER CALC-MCNC: 2.7 G/DL (ref 1.9–3.5)
GLUCOSE SERPL-MCNC: 88 MG/DL (ref 65–99)
HDLC SERPL-MCNC: 43 MG/DL
LDLC SERPL CALC-MCNC: 92 MG/DL
POTASSIUM SERPL-SCNC: 5.2 MMOL/L (ref 3.6–5.5)
PROT SERPL-MCNC: 7.4 G/DL (ref 6–8.2)
SODIUM SERPL-SCNC: 142 MMOL/L (ref 135–145)
TRIGL SERPL-MCNC: 102 MG/DL (ref 0–149)

## 2021-11-22 PROCEDURE — 80053 COMPREHEN METABOLIC PANEL: CPT

## 2021-11-22 PROCEDURE — 80061 LIPID PANEL: CPT

## 2021-11-22 PROCEDURE — 36415 COLL VENOUS BLD VENIPUNCTURE: CPT

## 2021-11-30 ENCOUNTER — OFFICE VISIT (OUTPATIENT)
Dept: CARDIOLOGY | Facility: MEDICAL CENTER | Age: 71
End: 2021-11-30
Payer: MEDICARE

## 2021-11-30 VITALS
DIASTOLIC BLOOD PRESSURE: 98 MMHG | HEART RATE: 70 BPM | OXYGEN SATURATION: 97 % | SYSTOLIC BLOOD PRESSURE: 140 MMHG | BODY MASS INDEX: 31.94 KG/M2 | WEIGHT: 241 LBS | HEIGHT: 73 IN

## 2021-11-30 DIAGNOSIS — E78.5 DYSLIPIDEMIA: ICD-10-CM

## 2021-11-30 DIAGNOSIS — I25.10 CORONARY ARTERY DISEASE INVOLVING NATIVE CORONARY ARTERY OF NATIVE HEART WITHOUT ANGINA PECTORIS: ICD-10-CM

## 2021-11-30 DIAGNOSIS — I10 BENIGN ESSENTIAL HTN: ICD-10-CM

## 2021-11-30 DIAGNOSIS — I25.111 CORONARY ARTERY DISEASE INVOLVING NATIVE CORONARY ARTERY OF NATIVE HEART WITH ANGINA PECTORIS WITH DOCUMENTED SPASM (HCC): ICD-10-CM

## 2021-11-30 PROCEDURE — 99214 OFFICE O/P EST MOD 30 MIN: CPT | Performed by: INTERNAL MEDICINE

## 2021-11-30 RX ORDER — ATORVASTATIN CALCIUM 80 MG/1
80 TABLET, FILM COATED ORAL DAILY
Qty: 90 TABLET | Refills: 3 | Status: SHIPPED | OUTPATIENT
Start: 2021-11-30 | End: 2022-12-01

## 2021-11-30 RX ORDER — LISINOPRIL AND HYDROCHLOROTHIAZIDE 25; 20 MG/1; MG/1
1 TABLET ORAL DAILY
Qty: 90 TABLET | Refills: 3 | Status: SHIPPED | OUTPATIENT
Start: 2021-11-30 | End: 2022-12-01

## 2021-11-30 RX ORDER — CARVEDILOL 6.25 MG/1
TABLET ORAL
Qty: 180 TABLET | Refills: 3 | Status: SHIPPED | OUTPATIENT
Start: 2021-11-30 | End: 2022-12-01

## 2021-11-30 NOTE — PROGRESS NOTES
Subjective:   Deniz Julio is a 71 y.o. male who presents today for followup of ischemic cardiomyopathy with recovered EF and CAD with three-vessel CAD status post CABG on 2/16/15 (3 vessels), CKD, postoperative DVT. He is feeling well, NYHA class I, walks 3 miles per day and continues to lose significant amounts of weight due to diet and exercise. His ejection fraction has normalized. Tolerating medical therapy.     Since last visit he is doing well but his lipid profile remains suboptimal and his blood pressure though better at home remains slightly elevated in the office today.  No symptoms and very active.    Past Surgical History:   Procedure Laterality Date   • MULTIPLE CORONARY ARTERY BYPASS ENDO VEIN HARVEST  2/16/2015    Performed by Ino Culver M.D. at SURGERY [facility]   • TONSILLECTOMY       Family History   Problem Relation Age of Onset   • Heart Disease Mother    • Hypertension Mother    • Heart Disease Father    • Other Father         GI Bleeding   • Hypertension Father      Social History     Tobacco Use   Smoking Status Former Smoker   • Types: Cigarettes   Smokeless Tobacco Never Used     Allergies   Allergen Reactions   • Tetracycline      Unknown rxn.   • Tripelennamine      Unknown rxn.     Outpatient Encounter Medications as of 11/30/2021   Medication Sig Dispense Refill   • Misc Natural Products (PROSTATE HEALTH) Cap Take  by mouth.     • Multiple Vitamins-Minerals (OCUVITE EXTRA PO) Take  by mouth.     • atorvastatin (LIPITOR) 80 MG tablet Take 1 Tablet by mouth every day. 90 Tablet 3   • lisinopril-hydrochlorothiazide (PRINZIDE) 20-25 MG per tablet Take 1 Tablet by mouth every day. 90 Tablet 3   • carvedilol (COREG) 6.25 MG Tab PO  Tablet 3   • Coenzyme Q10 (CO Q 10 PO) Take  by mouth.     • aspirin 81 MG EC tablet Take 1 Tab by mouth every day. 30 Tab    • [DISCONTINUED] lisinopril-hydrochlorothiazide (PRINZIDE) 20-25 MG per tablet TAKE ONE TABLET BY MOUTH DAILY 90 tablet 1  "  • [DISCONTINUED] atorvastatin (LIPITOR) 40 MG Tab TAKE ONE TABLET BY MOUTH DAILY ---LIPITOR 90 tablet 2   • [DISCONTINUED] carvedilol (COREG) 6.25 MG Tab TAKE ONE TABLET BY MOUTH TWICE A DAY WITH MEALS --COREG 180 tablet 2   • [DISCONTINUED] Multiple Minerals-Vitamins (PAULA-MAG-ZINC-D PO) Take  by mouth. (Patient not taking: Reported on 11/30/2021)     • [DISCONTINUED] BETA CAROTENE PO Take  by mouth. (Patient not taking: Reported on 11/30/2021)       No facility-administered encounter medications on file as of 11/30/2021.     Review of Systems   All other systems reviewed and are negative.       Objective:   /98 (BP Location: Left arm, Patient Position: Sitting)   Pulse 70   Ht 1.854 m (6' 1\")   Wt 109 kg (241 lb)   SpO2 97%   BMI 31.80 kg/m²     Physical Exam  Constitutional:       General: He is not in acute distress.     Appearance: He is well-developed. He is not diaphoretic.   HENT:      Head: Normocephalic and atraumatic.   Eyes:      General: No scleral icterus.     Conjunctiva/sclera: Conjunctivae normal.      Pupils: Pupils are equal, round, and reactive to light.   Neck:      Thyroid: No thyromegaly.      Vascular: No JVD.      Trachea: No tracheal deviation.   Cardiovascular:      Rate and Rhythm: Normal rate and regular rhythm.      Chest Wall: PMI is not displaced.      Pulses:           Carotid pulses are 2+ on the right side and 2+ on the left side.       Radial pulses are 2+ on the right side and 2+ on the left side.        Dorsalis pedis pulses are 2+ on the right side and 2+ on the left side.        Posterior tibial pulses are 2+ on the right side and 2+ on the left side.      Heart sounds: Normal heart sounds and S1 normal. No murmur heard.  No friction rub. No gallop.    Pulmonary:      Effort: Pulmonary effort is normal.      Breath sounds: Normal breath sounds. No wheezing or rales.   Abdominal:      General: Bowel sounds are normal. There is no distension.      Palpations: Abdomen " is soft. There is no pulsatile mass.      Tenderness: There is no abdominal tenderness. There is no guarding.   Skin:     General: Skin is warm and dry.      Findings: No erythema or rash.   Neurological:      Mental Status: He is alert and oriented to person, place, and time.      Cranial Nerves: No cranial nerve deficit.   Psychiatric:         Behavior: Behavior normal.         Thought Content: Thought content normal.       LABS:  Lab Results   Component Value Date/Time    CHOLSTRLTOT 155 11/22/2021 07:05 AM    LDL 92 11/22/2021 07:05 AM    HDL 43 11/22/2021 07:05 AM    TRIGLYCERIDE 102 11/22/2021 07:05 AM       Lab Results   Component Value Date/Time    WBC 6.1 08/27/2019 07:35 AM    RBC 4.58 (L) 08/27/2019 07:35 AM    HEMOGLOBIN 14.7 08/27/2019 07:35 AM    HEMATOCRIT 45.2 08/27/2019 07:35 AM    MCV 98.7 (H) 08/27/2019 07:35 AM    NEUTSPOLYS 36.7 (L) 03/16/2015 11:40 AM    LYMPHOCYTES 47.6 (H) 03/16/2015 11:40 AM    MONOCYTES 9.4 03/16/2015 11:40 AM    EOSINOPHILS 4.9 03/16/2015 11:40 AM    BASOPHILS 1.3 03/16/2015 11:40 AM     Lab Results   Component Value Date/Time    SODIUM 142 11/22/2021 07:05 AM    POTASSIUM 5.2 11/22/2021 07:05 AM    CHLORIDE 110 11/22/2021 07:05 AM    CO2 22 11/22/2021 07:05 AM    GLUCOSE 88 11/22/2021 07:05 AM    BUN 38 (H) 11/22/2021 07:05 AM    CREATININE 1.39 11/22/2021 07:05 AM         Lab Results   Component Value Date/Time    ALKPHOSPHAT 75 11/22/2021 07:05 AM    ASTSGOT 12 11/22/2021 07:05 AM    ALTSGPT 16 11/22/2021 07:05 AM    TBILIRUBIN 0.6 11/22/2021 07:05 AM      Lab Results   Component Value Date/Time    BNPBTYPENAT 1182 (H) 03/13/2015 11:15 AM      No results found for: TSH  Lab Results   Component Value Date/Time    PROTHROMBTM 23.4 (H) 03/16/2015 11:40 AM    INR 2.2 12/29/2015 12:00 AM          ECHO CONCLUSIONS (7/9/2015):  Normal left ventricular chamber size.  Normal left ventricular systolic function.  Left ventricular ejection fraction is 55% to 60%.  Grade II  diastolic dysfunction - pseudonormal mitral inflow is   observed.    Compared to prior study of 2-6-2015 -- LVEF is now normal and diastolic   function has improved.      Assessment:     1. Dyslipidemia  atorvastatin (LIPITOR) 80 MG tablet    lisinopril-hydrochlorothiazide (PRINZIDE) 20-25 MG per tablet    carvedilol (COREG) 6.25 MG Tab   2. Coronary artery disease involving native coronary artery of native heart without angina pectoris  lisinopril-hydrochlorothiazide (PRINZIDE) 20-25 MG per tablet    carvedilol (COREG) 6.25 MG Tab   3. Benign essential HTN  lisinopril-hydrochlorothiazide (PRINZIDE) 20-25 MG per tablet    carvedilol (COREG) 6.25 MG Tab   4. CAD s/p CABG with recovered LVEF  lisinopril-hydrochlorothiazide (PRINZIDE) 20-25 MG per tablet    carvedilol (COREG) 6.25 MG Tab       Medical Decision Making:  Today's Assessment / Status / Plan:     He has blood pressure fluctuations likely related to his untreated obstructive sleep apnea.  Is gained a low bit of weight but is still remaining very active and trying to follow a healthful diet.  Lipid profile is suboptimal with a goal LDL less than 70.  We discussed options and I recommended increasing his atorvastatin from 40 to 80 mg daily.  Other medications were refilled without change.  We discussed stress testing and will defer as he is very active until such time as he becomes less active or develops any symptomology.    Follow-up in 6 months.

## 2022-06-07 ENCOUNTER — OFFICE VISIT (OUTPATIENT)
Dept: MEDICAL GROUP | Facility: PHYSICIAN GROUP | Age: 72
End: 2022-06-07
Payer: MEDICARE

## 2022-06-07 VITALS
OXYGEN SATURATION: 96 % | WEIGHT: 245.2 LBS | HEART RATE: 50 BPM | HEIGHT: 73 IN | TEMPERATURE: 97.8 F | BODY MASS INDEX: 32.5 KG/M2 | DIASTOLIC BLOOD PRESSURE: 100 MMHG | RESPIRATION RATE: 16 BRPM | SYSTOLIC BLOOD PRESSURE: 160 MMHG

## 2022-06-07 DIAGNOSIS — I10 BENIGN ESSENTIAL HTN: ICD-10-CM

## 2022-06-07 DIAGNOSIS — E78.5 DYSLIPIDEMIA: ICD-10-CM

## 2022-06-07 DIAGNOSIS — Z00.00 MEDICARE ANNUAL WELLNESS VISIT, SUBSEQUENT: ICD-10-CM

## 2022-06-07 DIAGNOSIS — I25.10 CORONARY ARTERY DISEASE INVOLVING NATIVE CORONARY ARTERY OF NATIVE HEART WITHOUT ANGINA PECTORIS: ICD-10-CM

## 2022-06-07 PROCEDURE — G0439 PPPS, SUBSEQ VISIT: HCPCS | Performed by: FAMILY MEDICINE

## 2022-06-07 ASSESSMENT — ACTIVITIES OF DAILY LIVING (ADL): BATHING_REQUIRES_ASSISTANCE: 0

## 2022-06-07 ASSESSMENT — PATIENT HEALTH QUESTIONNAIRE - PHQ9: CLINICAL INTERPRETATION OF PHQ2 SCORE: 0

## 2022-06-07 ASSESSMENT — ENCOUNTER SYMPTOMS: GENERAL WELL-BEING: GOOD

## 2022-06-07 NOTE — PROGRESS NOTES
Chief Complaint   Patient presents with   • Annual Exam         HPI:  Deniz is a 72 y.o. here for Medicare Annual Wellness Visit        Patient Active Problem List    Diagnosis Date Noted   • Benign essential HTN 01/05/2017   • Obstructive sleep apnea 08/22/2016   • S/P CABG x 3 02/23/2015   • Coronary artery disease involving native coronary artery of native heart without angina pectoris 02/23/2015   • Dyslipidemia 02/23/2015   • Chronic kidney disease 02/23/2015       Current Outpatient Medications   Medication Sig Dispense Refill   • Multiple Vitamins-Minerals (OCUVITE EXTRA PO) Take  by mouth.     • atorvastatin (LIPITOR) 80 MG tablet Take 1 Tablet by mouth every day. 90 Tablet 3   • lisinopril-hydrochlorothiazide (PRINZIDE) 20-25 MG per tablet Take 1 Tablet by mouth every day. 90 Tablet 3   • carvedilol (COREG) 6.25 MG Tab PO  Tablet 3   • Coenzyme Q10 (CO Q 10 PO) Take  by mouth.     • aspirin 81 MG EC tablet Take 1 Tab by mouth every day. 30 Tab    • Misc Natural Products (PROSTATE HEALTH) Cap Take  by mouth. (Patient not taking: Reported on 6/7/2022)       No current facility-administered medications for this visit.        Patient is taking medications as noted in medication list.  Current supplements as per medication list.     Allergies: Tetracycline and Tripelennamine    Current social contact/activities:      Is patient current with immunizations?Patient was advised that one or more vaccines are recommended today as according to their HM. They currently decline all vaccines advised today despite our encouragement      He  reports that he has quit smoking. His smoking use included cigarettes. He has never used smokeless tobacco. He reports current alcohol use. He reports that he does not use drugs.  Counseling given: Not Answered        DPA/Advanced directive: Patient does not have an Advanced Directive.  A packet and workshop information was given on Advanced Directives.    ROS:    Gait: Uses no  assistive device   Ostomy: No   Other tubes: No   Amputations: No   Chronic oxygen use No   Last eye exam 2021   Wears hearing aids: No   : Denies any urinary leakage during the last 6 months      Screening:        Depression Screening  Little interest or pleasure in doing things?  0 - not at all  Feeling down, depressed, or hopeless? 0 - not at all  Patient Health Questionnaire Score: 0    If depressive symptoms identified deferred to follow up visit unless specifically addressed in assessment and plan.    Interpretation of PHQ-9 Total Score   Score Severity   1-4 No Depression   5-9 Mild Depression   10-14 Moderate Depression   15-19 Moderately Severe Depression   20-27 Severe Depression    Screening for Cognitive Impairment  Three Minute Recall (daughter, heaven, mountain)  3/3    Spencer clock face with all 12 numbers and set the hands to show 10 past 11.  Yes    If cognitive concerns identified, deferred for follow up unless specifically addressed in assessment and plan.    Fall Risk Assessment  Has the patient had two or more falls in the last year or any fall with injury in the last year?  No  If fall risk identified, deferred for follow up unless specifically addressed in assessment and plan.    Safety Assessment  Throw rugs on floor.  Yes  Handrails on all stairs.  Yes  Good lighting in all hallways.  Yes  Difficulty hearing.  No  Patient counseled about all safety risks that were identified.    Functional Assessment ADLs  Are there any barriers preventing you from cooking for yourself or meeting nutritional needs?  No.    Are there any barriers preventing you from driving safely or obtaining transportation?  No.    Are there any barriers preventing you from using a telephone or calling for help?  No.    Are there any barriers preventing you from shopping?  No.    Are there any barriers preventing you from taking care of your own finances?  No.    Are there any barriers preventing you from managing your  medications?  No.    Are there any barriers preventing you from showering, bathing or dressing yourself?  No.    Are you currently engaging in any exercise or physical activity?  Yes.     What is your perception of your health?  Good.    Health Maintenance Summary          Overdue - IMM DTaP/Tdap/Td Vaccine (1 - Tdap) Overdue - never done    No completion history exists for this topic.          Overdue - IMM ZOSTER VACCINES (1 of 2) Overdue - never done    No completion history exists for this topic.          Overdue - Annual Wellness Visit (Every 366 Days) Overdue since 4/6/2022 04/05/2021  Done    04/05/2021  Visit Dx: Medicare annual wellness visit, subsequent    03/03/2020  Done    03/03/2020  Visit Dx: Medicare annual wellness visit, subsequent    01/05/2017  Visit Dx: Medicare annual wellness visit, initial    Only the first 5 history entries have been loaded, but more history exists.          Overdue - COVID-19 Vaccine (4 - Booster for Moderna series) Overdue since 5/13/2022 01/13/2022  Imm Admin: Moderna SARS-CoV-2 Vaccine    03/13/2021  Imm Admin: Moderna SARS-CoV-2 Vaccine    02/13/2021  Imm Admin: Moderna SARS-CoV-2 Vaccine          IMM INFLUENZA (Season Ended) Next due on 9/1/2022 01/05/2017  Imm Admin: Influenza Vaccine Adult HD          COLORECTAL CANCER SCREENING (COLONOSCOPY - Every 10 Years) Next due on 3/13/2025    03/13/2015  COLONOSCOPY (Patient Declined)          IMM PNEUMOCOCCAL VACCINE: 65+ Years (Series Information) Completed    01/05/2017  Imm Admin: Pneumococcal Conjugate Vaccine (Prevnar/PCV-13)    02/06/2015  Imm Admin: Pneumococcal polysaccharide vaccine (PPSV-23)          IMM HEP B VACCINE (Series Information) Aged Out    No completion history exists for this topic.          IMM MENINGOCOCCAL VACCINE (MCV4) (Series Information) Aged Out    No completion history exists for this topic.          Discontinued - HEPATITIS C SCREENING  Discontinued    No completion history exists  "for this topic.                Patient Care Team:  Joshua Fu M.D. as PCP - General (Family Medicine)  Bennett as Respiratory Therapist    Social History     Tobacco Use   • Smoking status: Former Smoker     Types: Cigarettes   • Smokeless tobacco: Never Used   Vaping Use   • Vaping Use: Never used   Substance Use Topics   • Alcohol use: Yes     Alcohol/week: 0.0 oz     Comment: Wine occ   • Drug use: No     Family History   Problem Relation Age of Onset   • Heart Disease Mother    • Hypertension Mother    • Heart Disease Father    • Other Father         GI Bleeding   • Hypertension Father      He  has a past medical history of CAD (coronary artery disease), native coronary artery, Chronic kidney disease, DVT (deep venous thrombosis) (HCC), Hypertension, Obesity, and HEYDI (obstructive sleep apnea).   Past Surgical History:   Procedure Laterality Date   • MULTIPLE CORONARY ARTERY BYPASS ENDO VEIN HARVEST  2/16/2015    Performed by Ino Culver M.D. at SURGERY [facility]   • TONSILLECTOMY             Exam:     Pulse (!) 50   Temp 36.6 °C (97.8 °F) (Temporal)   Resp 16   Ht 1.854 m (6' 1\")   Wt 111 kg (245 lb 3.2 oz)   SpO2 96%  Body mass index is 32.35 kg/m².    Hearing good.    Dentition good  Alert, oriented in no acute distress.  Eye contact is good, speech goal directed, affect calm      Assessment and Plan. The following treatment and monitoring plan is recommended:    1. Medicare annual wellness visit, subsequent  Patient was advised that one or more vaccines are recommended today as according to their HM. They currently decline all vaccines advised today despite our encouragement      2. Coronary artery disease involving native coronary artery of native heart without angina pectoris  The patient's current medical issue is well controlled on the current therapy with no new symptoms or worsening      3. Benign essential HTN  Currently treated for HTN, taking meds with no CP or sob, monitors bp " at home periodically. controlled        4. Dyslipidemia  Currently treated for HLD, taking meds with no new myalgias or joint pain, watching fats in diet  controlled        Past Medical History:   Diagnosis Date   • CAD (coronary artery disease), native coronary artery    • Chronic kidney disease    • DVT (deep venous thrombosis) (HCC)     had dvt after harvest of thigh vein, goes to coumadin clini   • Hypertension    • Obesity    • HEYDI (obstructive sleep apnea)      Past Surgical History:   Procedure Laterality Date   • MULTIPLE CORONARY ARTERY BYPASS ENDO VEIN HARVEST  2/16/2015    Performed by Ino Culver M.D. at SURGERY [facility]   • TONSILLECTOMY       Social History     Tobacco Use   • Smoking status: Former Smoker     Types: Cigarettes   • Smokeless tobacco: Never Used   Vaping Use   • Vaping Use: Never used   Substance Use Topics   • Alcohol use: Yes     Alcohol/week: 0.0 oz     Comment: Wine occ   • Drug use: No     Family History   Problem Relation Age of Onset   • Heart Disease Mother    • Hypertension Mother    • Heart Disease Father    • Other Father         GI Bleeding   • Hypertension Father          Current Outpatient Medications:   •  Multiple Vitamins-Minerals (OCUVITE EXTRA PO), Take  by mouth., Disp: , Rfl:   •  atorvastatin (LIPITOR) 80 MG tablet, Take 1 Tablet by mouth every day., Disp: 90 Tablet, Rfl: 3  •  lisinopril-hydrochlorothiazide (PRINZIDE) 20-25 MG per tablet, Take 1 Tablet by mouth every day., Disp: 90 Tablet, Rfl: 3  •  carvedilol (COREG) 6.25 MG Tab, PO BID, Disp: 180 Tablet, Rfl: 3  •  Coenzyme Q10 (CO Q 10 PO), Take  by mouth., Disp: , Rfl:   •  aspirin 81 MG EC tablet, Take 1 Tab by mouth every day., Disp: 30 Tab, Rfl:     Patient was instructed on the use of medications, either prescriptions or OTC and informed on when the appropriate follow up time period should be. In addition, patient was also instructed that should any acute worsening occur that they should notify  this clinic asap or call 911.          Services suggested: No services needed at this time  Health Care Screening recommendations as per orders if indicated.  Referrals offered: PT/OT/Nutrition counseling/Behavioral Health/Smoking cessation as per orders if indicated.    Discussion today about general wellness and lifestyle habits:    · Prevent falls and reduce trip hazards; Cautioned about securing or removing rugs.  · Have a working fire alarm and carbon monoxide detector;   · Engage in regular physical activity and social activities       Follow-up: No follow-ups on file.

## 2022-09-20 ENCOUNTER — OFFICE VISIT (OUTPATIENT)
Dept: CARDIOLOGY | Facility: MEDICAL CENTER | Age: 72
End: 2022-09-20
Payer: MEDICARE

## 2022-09-20 VITALS
HEIGHT: 73 IN | RESPIRATION RATE: 16 BRPM | OXYGEN SATURATION: 98 % | HEART RATE: 50 BPM | DIASTOLIC BLOOD PRESSURE: 70 MMHG | SYSTOLIC BLOOD PRESSURE: 170 MMHG | WEIGHT: 247 LBS | BODY MASS INDEX: 32.74 KG/M2

## 2022-09-20 DIAGNOSIS — G47.33 OBSTRUCTIVE SLEEP APNEA: ICD-10-CM

## 2022-09-20 DIAGNOSIS — I25.111 CORONARY ARTERY DISEASE INVOLVING NATIVE CORONARY ARTERY OF NATIVE HEART WITH ANGINA PECTORIS WITH DOCUMENTED SPASM (HCC): ICD-10-CM

## 2022-09-20 DIAGNOSIS — I10 BENIGN ESSENTIAL HTN: ICD-10-CM

## 2022-09-20 DIAGNOSIS — R01.1 UNDIAGNOSED CARDIAC MURMURS: ICD-10-CM

## 2022-09-20 PROCEDURE — 99214 OFFICE O/P EST MOD 30 MIN: CPT | Performed by: INTERNAL MEDICINE

## 2022-09-20 NOTE — PROGRESS NOTES
"Subjective:   Deniz Julio is a 72 y.o. male who presents today for followup of ischemic cardiomyopathy with recovered EF and CAD with three-vessel CAD status post CABG on 2/16/15 (3 vessels), CKD, postoperative DVT. He is feeling well, NYHA class I, walks 3 miles per day and continues to lose significant amounts of weight due to diet and exercise. His ejection fraction has normalized. Tolerating medical therapy.     Since last visit he has no cardiac issues and has been exercising.  He has gained further weight unfortunately.    Past Surgical History:   Procedure Laterality Date    MULTIPLE CORONARY ARTERY BYPASS ENDO VEIN HARVEST  2/16/2015    Performed by Ino Culver M.D. at SURGERY [facility] ORS    TONSILLECTOMY       Family History   Problem Relation Age of Onset    Heart Disease Mother     Hypertension Mother     Heart Disease Father     Other Father         GI Bleeding    Hypertension Father      Social History     Tobacco Use   Smoking Status Former    Types: Cigarettes   Smokeless Tobacco Never     Allergies   Allergen Reactions    Tetracycline      Unknown rxn.    Tripelennamine      Unknown rxn.     Outpatient Encounter Medications as of 9/20/2022   Medication Sig Dispense Refill    Multiple Vitamins-Minerals (OCUVITE EXTRA PO) Take  by mouth.      atorvastatin (LIPITOR) 80 MG tablet Take 1 Tablet by mouth every day. 90 Tablet 3    lisinopril-hydrochlorothiazide (PRINZIDE) 20-25 MG per tablet Take 1 Tablet by mouth every day. 90 Tablet 3    carvedilol (COREG) 6.25 MG Tab PO  Tablet 3    Coenzyme Q10 (CO Q 10 PO) Take  by mouth.      aspirin 81 MG EC tablet Take 1 Tab by mouth every day. 30 Tab      No facility-administered encounter medications on file as of 9/20/2022.     Review of Systems   All other systems reviewed and are negative.     Objective:   BP (!) 170/70 (BP Location: Left arm, Patient Position: Sitting)   Pulse (!) 50   Resp 16   Ht 1.854 m (6' 1\")   Wt 112 kg (247 lb)   " SpO2 98%   BMI 32.59 kg/m²     Physical Exam  Constitutional:       General: He is not in acute distress.     Appearance: He is well-developed. He is not diaphoretic.   HENT:      Head: Normocephalic and atraumatic.   Eyes:      General: No scleral icterus.     Conjunctiva/sclera: Conjunctivae normal.      Pupils: Pupils are equal, round, and reactive to light.   Neck:      Thyroid: No thyromegaly.      Vascular: No JVD.      Trachea: No tracheal deviation.   Cardiovascular:      Rate and Rhythm: Normal rate and regular rhythm.      Chest Wall: PMI is not displaced.      Pulses:           Carotid pulses are 2+ on the right side and 2+ on the left side.       Radial pulses are 2+ on the right side and 2+ on the left side.        Dorsalis pedis pulses are 2+ on the right side and 2+ on the left side.        Posterior tibial pulses are 2+ on the right side and 2+ on the left side.      Heart sounds: S1 normal. Murmur (2/6 systolic murmur) heard.     No friction rub. No gallop.   Pulmonary:      Effort: Pulmonary effort is normal.      Breath sounds: Normal breath sounds. No wheezing or rales.   Abdominal:      General: Bowel sounds are normal. There is no distension.      Palpations: Abdomen is soft. There is no pulsatile mass.      Tenderness: There is no abdominal tenderness. There is no guarding.   Skin:     General: Skin is warm and dry.      Findings: No erythema or rash.   Neurological:      Mental Status: He is alert and oriented to person, place, and time.      Cranial Nerves: No cranial nerve deficit.   Psychiatric:         Behavior: Behavior normal.         Thought Content: Thought content normal.     LABS:  Lab Results   Component Value Date/Time    CHOLSTRLTOT 155 11/22/2021 07:05 AM    LDL 92 11/22/2021 07:05 AM    HDL 43 11/22/2021 07:05 AM    TRIGLYCERIDE 102 11/22/2021 07:05 AM       Lab Results   Component Value Date/Time    WBC 6.1 08/27/2019 07:35 AM    RBC 4.58 (L) 08/27/2019 07:35 AM    HEMOGLOBIN  14.7 08/27/2019 07:35 AM    HEMATOCRIT 45.2 08/27/2019 07:35 AM    MCV 98.7 (H) 08/27/2019 07:35 AM    NEUTSPOLYS 36.7 (L) 03/16/2015 11:40 AM    LYMPHOCYTES 47.6 (H) 03/16/2015 11:40 AM    MONOCYTES 9.4 03/16/2015 11:40 AM    EOSINOPHILS 4.9 03/16/2015 11:40 AM    BASOPHILS 1.3 03/16/2015 11:40 AM     Lab Results   Component Value Date/Time    SODIUM 142 11/22/2021 07:05 AM    POTASSIUM 5.2 11/22/2021 07:05 AM    CHLORIDE 110 11/22/2021 07:05 AM    CO2 22 11/22/2021 07:05 AM    GLUCOSE 88 11/22/2021 07:05 AM    BUN 38 (H) 11/22/2021 07:05 AM    CREATININE 1.39 11/22/2021 07:05 AM         Lab Results   Component Value Date/Time    ALKPHOSPHAT 75 11/22/2021 07:05 AM    ASTSGOT 12 11/22/2021 07:05 AM    ALTSGPT 16 11/22/2021 07:05 AM    TBILIRUBIN 0.6 11/22/2021 07:05 AM      Lab Results   Component Value Date/Time    BNPBTYPENAT 1182 (H) 03/13/2015 11:15 AM      No results found for: TSH  Lab Results   Component Value Date/Time    PROTHROMBTM 23.4 (H) 03/16/2015 11:40 AM    INR 2.2 12/29/2015 12:00 AM          ECHO CONCLUSIONS (7/9/2015):  Normal left ventricular chamber size.  Normal left ventricular systolic function.  Left ventricular ejection fraction is 55% to 60%.  Grade II diastolic dysfunction - pseudonormal mitral inflow is   observed.    Compared to prior study of 2-6-2015 -- LVEF is now normal and diastolic   function has improved.      Assessment:     1. Coronary artery disease involving native coronary artery of native heart with angina pectoris with documented spasm (HCC)  Comp Metabolic Panel    Lipid Profile      2. Benign essential HTN  Comp Metabolic Panel    Lipid Profile      3. Obstructive sleep apnea        4. Undiagnosed cardiac murmurs  EC-ECHOCARDIOGRAM COMPLETE W/O CONT          Medical Decision Making:  Today's Assessment / Status / Plan:     Doing well.  Inquires about possible nerve stimulator for his sleep apnea.  He would tolerate this procedure well from a cardiac perspective however  its efficacy and the details of the procedure I will defer discussions to the implanting physicians. Due for CMP and metabolic profile as well as echocardiographic follow-up for a new murmur.Indicates home blood pressures are better.

## 2022-11-27 DIAGNOSIS — I10 BENIGN ESSENTIAL HTN: ICD-10-CM

## 2022-11-27 DIAGNOSIS — I25.111 CORONARY ARTERY DISEASE INVOLVING NATIVE CORONARY ARTERY OF NATIVE HEART WITH ANGINA PECTORIS WITH DOCUMENTED SPASM (HCC): ICD-10-CM

## 2022-11-27 DIAGNOSIS — I25.10 CORONARY ARTERY DISEASE INVOLVING NATIVE CORONARY ARTERY OF NATIVE HEART WITHOUT ANGINA PECTORIS: ICD-10-CM

## 2022-11-27 DIAGNOSIS — E78.5 DYSLIPIDEMIA: ICD-10-CM

## 2022-11-30 NOTE — TELEPHONE ENCOUNTER
Is the patient due for a refill? Yes    Was the patient seen the past year? Yes    Date of last office visit: 9/20/2022    Does the patient have an upcoming appointment?  Yes   If yes, When? 3/10/2023    Provider to refill:TW    Does the patients insurance require a 100 day supply?  No

## 2022-12-01 RX ORDER — CARVEDILOL 6.25 MG/1
TABLET ORAL
Qty: 180 TABLET | Refills: 2 | Status: SHIPPED | OUTPATIENT
Start: 2022-12-01 | End: 2023-08-30 | Stop reason: SDUPTHER

## 2022-12-01 RX ORDER — ATORVASTATIN CALCIUM 80 MG/1
TABLET, FILM COATED ORAL
Qty: 90 TABLET | Refills: 2 | Status: SHIPPED | OUTPATIENT
Start: 2022-12-01 | End: 2023-08-30 | Stop reason: SDUPTHER

## 2022-12-01 RX ORDER — LISINOPRIL AND HYDROCHLOROTHIAZIDE 25; 20 MG/1; MG/1
TABLET ORAL
Qty: 90 TABLET | Refills: 2 | Status: SHIPPED | OUTPATIENT
Start: 2022-12-01 | End: 2023-08-30 | Stop reason: SDUPTHER

## 2023-03-03 ENCOUNTER — HOSPITAL ENCOUNTER (OUTPATIENT)
Dept: CARDIOLOGY | Facility: MEDICAL CENTER | Age: 73
End: 2023-03-03
Attending: INTERNAL MEDICINE
Payer: MEDICARE

## 2023-03-03 DIAGNOSIS — R01.1 UNDIAGNOSED CARDIAC MURMURS: ICD-10-CM

## 2023-03-03 PROCEDURE — 93306 TTE W/DOPPLER COMPLETE: CPT

## 2023-03-05 LAB
LV EJECT FRACT  99904: 60
LV EJECT FRACT MOD 2C 99903: 50.25
LV EJECT FRACT MOD 4C 99902: 55.82
LV EJECT FRACT MOD BP 99901: 53.64

## 2023-03-05 PROCEDURE — 93306 TTE W/DOPPLER COMPLETE: CPT | Mod: 26 | Performed by: INTERNAL MEDICINE

## 2023-03-09 ENCOUNTER — TELEPHONE (OUTPATIENT)
Dept: CARDIOLOGY | Facility: MEDICAL CENTER | Age: 73
End: 2023-03-09
Payer: MEDICARE

## 2023-03-09 NOTE — TELEPHONE ENCOUNTER
Phone call to patient to discuss below, spoke to patients spouse. Answered all questions, patient has follow up scheduled in June and will call back if any questions or concerns.

## 2023-03-09 NOTE — TELEPHONE ENCOUNTER
----- Message from Brent Becker M.D. sent at 3/5/2023  1:06 PM PST -----  Please let her know echo looks good and he can follow-up as usual

## 2023-05-22 DIAGNOSIS — I25.111 CORONARY ARTERY DISEASE INVOLVING NATIVE CORONARY ARTERY OF NATIVE HEART WITH ANGINA PECTORIS WITH DOCUMENTED SPASM (HCC): ICD-10-CM

## 2023-05-22 DIAGNOSIS — I10 BENIGN ESSENTIAL HTN: ICD-10-CM

## 2023-06-07 ENCOUNTER — OFFICE VISIT (OUTPATIENT)
Dept: CARDIOLOGY | Facility: MEDICAL CENTER | Age: 73
End: 2023-06-07
Attending: INTERNAL MEDICINE
Payer: MEDICARE

## 2023-06-07 VITALS
BODY MASS INDEX: 29.42 KG/M2 | WEIGHT: 222 LBS | DIASTOLIC BLOOD PRESSURE: 70 MMHG | OXYGEN SATURATION: 99 % | SYSTOLIC BLOOD PRESSURE: 172 MMHG | HEART RATE: 48 BPM | RESPIRATION RATE: 16 BRPM | HEIGHT: 73 IN

## 2023-06-07 DIAGNOSIS — N17.9 AKI (ACUTE KIDNEY INJURY) (HCC): ICD-10-CM

## 2023-06-07 DIAGNOSIS — I10 ESSENTIAL HYPERTENSION: ICD-10-CM

## 2023-06-07 DIAGNOSIS — I25.10 CORONARY ARTERY DISEASE INVOLVING NATIVE CORONARY ARTERY OF NATIVE HEART WITHOUT ANGINA PECTORIS: ICD-10-CM

## 2023-06-07 DIAGNOSIS — I25.111 CORONARY ARTERY DISEASE INVOLVING NATIVE CORONARY ARTERY OF NATIVE HEART WITH ANGINA PECTORIS WITH DOCUMENTED SPASM (HCC): ICD-10-CM

## 2023-06-07 DIAGNOSIS — E78.2 MIXED HYPERLIPIDEMIA: ICD-10-CM

## 2023-06-07 PROCEDURE — 3077F SYST BP >= 140 MM HG: CPT | Performed by: INTERNAL MEDICINE

## 2023-06-07 PROCEDURE — 3078F DIAST BP <80 MM HG: CPT | Performed by: INTERNAL MEDICINE

## 2023-06-07 PROCEDURE — 99212 OFFICE O/P EST SF 10 MIN: CPT | Performed by: INTERNAL MEDICINE

## 2023-06-07 PROCEDURE — 99214 OFFICE O/P EST MOD 30 MIN: CPT | Performed by: INTERNAL MEDICINE

## 2023-06-07 RX ORDER — AMLODIPINE BESYLATE 5 MG/1
5 TABLET ORAL DAILY
Qty: 90 TABLET | Refills: 3 | Status: SHIPPED | OUTPATIENT
Start: 2023-06-07

## 2023-06-07 NOTE — PROGRESS NOTES
Subjective:   Deniz Julio is a 73 y.o. male who presents today for followup of ischemic cardiomyopathy with recovered EF and CAD with three-vessel CAD status post CABG on 2/16/15 (3 vessels), CKD, postoperative DVT. He is feeling well, NYHA class I, walks 3 miles per day and continues to lose significant amounts of weight due to diet and exercise. His ejection fraction has normalized. Tolerating medical therapy.     Since last visit doing well no symptoms but does notice his blood pressure is well controlled.  130s at home.  [place] hypertension as well.    Past Surgical History:   Procedure Laterality Date    MULTIPLE CORONARY ARTERY BYPASS ENDO VEIN HARVEST  2/16/2015    Performed by Ino Culver M.D. at [facility]    TONSILLECTOMY       Family History   Problem Relation Age of Onset    Heart Disease Mother     Hypertension Mother     Heart Disease Father     Other Father         GI Bleeding    Hypertension Father      Social History     Tobacco Use   Smoking Status Former    Types: Cigarettes   Smokeless Tobacco Never   Vaping Use    Vaping Use: Never used     Allergies   Allergen Reactions    Tetracycline      Unknown rxn.    Tripelennamine      Unknown rxn.     Outpatient Encounter Medications as of 6/7/2023   Medication Sig Dispense Refill    GARLIC PO Take  by mouth.      amLODIPine (NORVASC) 5 MG Tab Take 1 Tablet by mouth every day. 90 Tablet 3    atorvastatin (LIPITOR) 80 MG tablet TAKE ONE TABLET BY MOUTH DAILY 90 Tablet 2    carvedilol (COREG) 6.25 MG Tab TAKE ONE TABLET BY MOUTH TWICE A  Tablet 2    lisinopril-hydrochlorothiazide (PRINZIDE) 20-25 MG per tablet TAKE ONE TABLET BY MOUTH DAILY 90 Tablet 2    Multiple Vitamins-Minerals (OCUVITE EXTRA PO) Take  by mouth.      Coenzyme Q10 (CO Q 10 PO) Take  by mouth.      aspirin 81 MG EC tablet Take 1 Tab by mouth every day. 30 Tab      No facility-administered encounter medications on file as of 6/7/2023.     Review of Systems  "  All other systems reviewed and are negative.       Objective:   BP (!) 172/70 (BP Location: Left arm, Patient Position: Sitting)   Pulse (!) 48   Resp 16   Ht 1.854 m (6' 1\")   Wt 101 kg (222 lb)   SpO2 99%   BMI 29.29 kg/m²     Physical Exam  Constitutional:       General: He is not in acute distress.     Appearance: He is well-developed. He is not diaphoretic.   HENT:      Head: Normocephalic and atraumatic.   Eyes:      General: No scleral icterus.     Conjunctiva/sclera: Conjunctivae normal.      Pupils: Pupils are equal, round, and reactive to light.   Neck:      Thyroid: No thyromegaly.      Vascular: No JVD.      Trachea: No tracheal deviation.   Cardiovascular:      Rate and Rhythm: Normal rate and regular rhythm.      Chest Wall: PMI is not displaced.      Pulses:           Carotid pulses are 2+ on the right side and 2+ on the left side.       Radial pulses are 2+ on the right side and 2+ on the left side.        Dorsalis pedis pulses are 2+ on the right side and 2+ on the left side.        Posterior tibial pulses are 2+ on the right side and 2+ on the left side.      Heart sounds: S1 normal. Murmur (2/6 systolic murmur) heard.      No friction rub. No gallop.   Pulmonary:      Effort: Pulmonary effort is normal.      Breath sounds: Normal breath sounds. No wheezing or rales.   Abdominal:      General: Bowel sounds are normal. There is no distension.      Palpations: Abdomen is soft. There is no pulsatile mass.      Tenderness: There is no abdominal tenderness. There is no guarding.   Skin:     General: Skin is warm and dry.      Findings: No erythema or rash.   Neurological:      Mental Status: He is alert and oriented to person, place, and time.      Cranial Nerves: No cranial nerve deficit.   Psychiatric:         Behavior: Behavior normal.         Thought Content: Thought content normal.       LABS:  Lab Results   Component Value Date/Time    CHOLSTRLTOT 155 11/22/2021 07:05 AM    LDL 92 " 11/22/2021 07:05 AM    HDL 43 11/22/2021 07:05 AM    TRIGLYCERIDE 102 11/22/2021 07:05 AM       Lab Results   Component Value Date/Time    WBC 6.1 08/27/2019 07:35 AM    RBC 4.58 (L) 08/27/2019 07:35 AM    HEMOGLOBIN 14.7 08/27/2019 07:35 AM    HEMATOCRIT 45.2 08/27/2019 07:35 AM    MCV 98.7 (H) 08/27/2019 07:35 AM    NEUTSPOLYS 36.7 (L) 03/16/2015 11:40 AM    LYMPHOCYTES 47.6 (H) 03/16/2015 11:40 AM    MONOCYTES 9.4 03/16/2015 11:40 AM    EOSINOPHILS 4.9 03/16/2015 11:40 AM    BASOPHILS 1.3 03/16/2015 11:40 AM     Lab Results   Component Value Date/Time    SODIUM 137 05/22/2023 08:03 AM    SODIUM 142 11/22/2021 07:05 AM    POTASSIUM 5.0 05/22/2023 08:03 AM    POTASSIUM 5.2 11/22/2021 07:05 AM    CHLORIDE 106 05/22/2023 08:03 AM    CHLORIDE 110 11/22/2021 07:05 AM    CO2 25 05/22/2023 08:03 AM    CO2 22 11/22/2021 07:05 AM    GLUCOSE 94 05/22/2023 08:03 AM    GLUCOSE 88 11/22/2021 07:05 AM    BUN 33 (H) 05/22/2023 08:03 AM    BUN 38 (H) 11/22/2021 07:05 AM    CREATININE 1.51 (H) 05/22/2023 08:03 AM    CREATININE 1.39 11/22/2021 07:05 AM    GLOMRATE 45 (L) 05/22/2023 08:03 AM         Lab Results   Component Value Date/Time    ALKPHOSPHAT 76 05/22/2023 08:03 AM    ALKPHOSPHAT 75 11/22/2021 07:05 AM    ASTSGOT 13 (L) 05/22/2023 08:03 AM    ASTSGOT 12 11/22/2021 07:05 AM    ALTSGPT 14 (L) 05/22/2023 08:03 AM    ALTSGPT 16 11/22/2021 07:05 AM    TBILIRUBIN 0.8 05/22/2023 08:03 AM    TBILIRUBIN 0.6 11/22/2021 07:05 AM      Lab Results   Component Value Date/Time    BNPBTYPENAT 1182 (H) 03/13/2015 11:15 AM      No results found for: TSH  Lab Results   Component Value Date/Time    PROTHROMBTM 23.4 (H) 03/16/2015 11:40 AM    INR 2.2 12/29/2015 12:00 AM          ECHO CONCLUSIONS (7/9/2015):  Normal left ventricular chamber size.  Normal left ventricular systolic function.  Left ventricular ejection fraction is 55% to 60%.  Grade II diastolic dysfunction - pseudonormal mitral inflow is   observed.    Compared to prior study of  2-6-2015 -- LVEF is now normal and diastolic   function has improved.      Assessment:     1. Coronary artery disease involving native coronary artery of native heart without angina pectoris        2. Essential hypertension  amLODIPine (NORVASC) 5 MG Tab      3. Coronary artery disease involving native coronary artery of native heart with angina pectoris with documented spasm (HCC)        4. Mixed hyperlipidemia        5. ÁNGEL (acute kidney injury) ([facility])  Basic Metabolic Panel          Medical Decision Making:  Today's Assessment / Status / Plan:     Doing well.  Mild aortic insufficiency on echocardiogram but otherwise normal.  Blood pressure suboptimal add amlodipine.  ÁNGEL on labs may be dehydration recommend recheck and follow-up with PCP.  Once renal function has stabilized up titration of his Prinzide would be recommended if he needs further blood pressure control.  Goal LDL less than 70 which he is achieving.  Follow-up routinely.

## 2023-06-12 ENCOUNTER — OFFICE VISIT (OUTPATIENT)
Dept: MEDICAL GROUP | Facility: PHYSICIAN GROUP | Age: 73
End: 2023-06-12
Payer: MEDICARE

## 2023-06-12 VITALS
HEIGHT: 73 IN | WEIGHT: 220 LBS | SYSTOLIC BLOOD PRESSURE: 162 MMHG | RESPIRATION RATE: 12 BRPM | TEMPERATURE: 97.8 F | HEART RATE: 57 BPM | OXYGEN SATURATION: 97 % | DIASTOLIC BLOOD PRESSURE: 88 MMHG | BODY MASS INDEX: 29.16 KG/M2

## 2023-06-12 DIAGNOSIS — I10 BENIGN ESSENTIAL HTN: ICD-10-CM

## 2023-06-12 DIAGNOSIS — Z00.00 MEDICARE ANNUAL WELLNESS VISIT, SUBSEQUENT: ICD-10-CM

## 2023-06-12 DIAGNOSIS — I25.10 CORONARY ARTERY DISEASE INVOLVING NATIVE CORONARY ARTERY OF NATIVE HEART WITHOUT ANGINA PECTORIS: ICD-10-CM

## 2023-06-12 DIAGNOSIS — Z13.6 ENCOUNTER FOR ABDOMINAL AORTIC ANEURYSM (AAA) SCREENING: ICD-10-CM

## 2023-06-12 PROCEDURE — G0439 PPPS, SUBSEQ VISIT: HCPCS | Performed by: FAMILY MEDICINE

## 2023-06-12 PROCEDURE — 3079F DIAST BP 80-89 MM HG: CPT | Performed by: FAMILY MEDICINE

## 2023-06-12 PROCEDURE — 3077F SYST BP >= 140 MM HG: CPT | Performed by: FAMILY MEDICINE

## 2023-06-12 ASSESSMENT — ACTIVITIES OF DAILY LIVING (ADL): BATHING_REQUIRES_ASSISTANCE: 0

## 2023-06-12 ASSESSMENT — PATIENT HEALTH QUESTIONNAIRE - PHQ9: CLINICAL INTERPRETATION OF PHQ2 SCORE: 0

## 2023-06-12 ASSESSMENT — ENCOUNTER SYMPTOMS: GENERAL WELL-BEING: GOOD

## 2023-06-12 NOTE — PROGRESS NOTES
Chief Complaint   Patient presents with    Annual Exam       HPI:  Deniz is a 73 y.o. here for Medicare Annual Wellness Visit        Patient Active Problem List    Diagnosis Date Noted    Benign essential HTN 01/05/2017    Obstructive sleep apnea 08/22/2016    S/P CABG x 3 02/23/2015    Coronary artery disease involving native coronary artery of native heart without angina pectoris 02/23/2015    Dyslipidemia 02/23/2015    Chronic kidney disease 02/23/2015       Current Outpatient Medications   Medication Sig Dispense Refill    GARLIC PO Take  by mouth.      amLODIPine (NORVASC) 5 MG Tab Take 1 Tablet by mouth every day. 90 Tablet 3    atorvastatin (LIPITOR) 80 MG tablet TAKE ONE TABLET BY MOUTH DAILY 90 Tablet 2    carvedilol (COREG) 6.25 MG Tab TAKE ONE TABLET BY MOUTH TWICE A  Tablet 2    lisinopril-hydrochlorothiazide (PRINZIDE) 20-25 MG per tablet TAKE ONE TABLET BY MOUTH DAILY 90 Tablet 2    Multiple Vitamins-Minerals (OCUVITE EXTRA PO) Take  by mouth.      Coenzyme Q10 (CO Q 10 PO) Take  by mouth.      aspirin 81 MG EC tablet Take 1 Tab by mouth every day. 30 Tab      No current facility-administered medications for this visit.        Patient is taking medications as noted in medication list.  Current supplements as per medication list.     Allergies: Tetracycline and Tripelennamine    Current social contact/activities:      Is patient current with immunizations? Yes.    He  reports that he has quit smoking. His smoking use included cigarettes. He has never used smokeless tobacco. He reports current alcohol use. He reports that he does not use drugs.  Counseling given: Not Answered      ROS:    Gait: Uses no assistive device   Ostomy: No   Other tubes: No   Amputations: No   Chronic oxygen use No   Last eye exam 2022   Wears hearing aids: No   : Denies any urinary leakage during the last 6 months    Screening:    Depression Screening  Little interest or pleasure in doing things?  0 - not at  all  Feeling down, depressed, or hopeless? 0 - not at all  Patient Health Questionnaire Score: 0    If depressive symptoms identified deferred to follow up visit unless specifically addressed in assessment and plan.    Interpretation of PHQ-9 Total Score   Score Severity   1-4 No Depression   5-9 Mild Depression   10-14 Moderate Depression   15-19 Moderately Severe Depression   20-27 Severe Depression    Screening for Cognitive Impairment  Three Minute Recall (daughter, heaven, mountain)  3/3    Spencer clock face with all 12 numbers and set the hands to show 10 past 11.  Yes    If cognitive concerns identified, deferred for follow up unless specifically addressed in assessment and plan.    Fall Risk Assessment  Has the patient had two or more falls in the last year or any fall with injury in the last year?  No  If fall risk identified, deferred for follow up unless specifically addressed in assessment and plan.    Safety Assessment  Throw rugs on floor.  Yes  Handrails on all stairs.  Yes  Good lighting in all hallways.  Yes  Difficulty hearing.  No  Patient counseled about all safety risks that were identified.    Functional Assessment ADLs  Are there any barriers preventing you from cooking for yourself or meeting nutritional needs?  No.    Are there any barriers preventing you from driving safely or obtaining transportation?  No.    Are there any barriers preventing you from using a telephone or calling for help?  No.    Are there any barriers preventing you from shopping?  No.    Are there any barriers preventing you from taking care of your own finances?  No.    Are there any barriers preventing you from managing your medications?  No.    Are there any barriers preventing you from showering, bathing or dressing yourself?  No.    Are you currently engaging in any exercise or physical activity?  Yes.     What is your perception of your health?  Good.    Advance Care Planning  Do you have an Advance Directive,  Living Will, Durable Power of , or POLST? Yes    Living Will     is on file    Health Maintenance Summary            Overdue - ABDOMINAL AORTIC ANEURYSM (AAA) SCREEN (Once) Overdue - never done      No completion history exists for this topic.              Overdue - COVID-19 Vaccine (4 - Moderna series) Overdue since 3/10/2022      01/13/2022  Imm Admin: MODERNA SARS-COV-2 VACCINE (12+)    03/13/2021  Imm Admin: MODERNA SARS-COV-2 VACCINE (12+)    02/13/2021  Imm Admin: MODERNA SARS-COV-2 VACCINE (12+)              Overdue - Annual Wellness Visit (Every 366 Days) Overdue since 6/8/2023 06/07/2022  Done    06/07/2022  Visit Dx: Medicare annual wellness visit, subsequent    04/05/2021  Done    04/05/2021  Visit Dx: Medicare annual wellness visit, subsequent    03/03/2020  Done    Only the first 5 history entries have been loaded, but more history exists.              Postponed - IMM ZOSTER VACCINES (1 of 2) Postponed until 11/12/2023      No completion history exists for this topic.              Postponed - IMM DTaP/Tdap/Td Vaccine (1 - Tdap) Postponed until 6/12/2024      No completion history exists for this topic.              IMM INFLUENZA (Season Ended) Next due on 9/1/2023 01/05/2017  Imm Admin: Influenza Vaccine Adult HD              COLORECTAL CANCER SCREENING (COLONOSCOPY - Every 10 Years) Next due on 3/13/2025      03/13/2015  COLONOSCOPY (Patient Declined)              IMM PNEUMOCOCCAL VACCINE: 65+ Years (Series Information) Completed      01/05/2017  Imm Admin: Pneumococcal Conjugate Vaccine (Prevnar/PCV-13)    02/06/2015  Imm Admin: Pneumococcal polysaccharide vaccine (PPSV-23)              IMM HEP B VACCINE (Series Information) Aged Out      No completion history exists for this topic.              HPV Vaccines (Series Information) Aged Out      No completion history exists for this topic.              IMM MENINGOCOCCAL ACWY VACCINE (Series Information) Aged Out      No completion  "history exists for this topic.              Discontinued - HEPATITIS C SCREENING  Discontinued      No completion history exists for this topic.                    Patient Care Team:  Joshua Fu M.D. as PCP - General (Family Medicine)  Bennett as Respiratory Therapist    Social History     Tobacco Use    Smoking status: Former     Types: Cigarettes    Smokeless tobacco: Never   Vaping Use    Vaping Use: Never used   Substance Use Topics    Alcohol use: Yes     Alcohol/week: 0.0 oz     Comment: Wine occ    Drug use: No     Family History   Problem Relation Age of Onset    Heart Disease Mother     Hypertension Mother     Heart Disease Father     Other Father         GI Bleeding    Hypertension Father      He  has a past medical history of CAD (coronary artery disease), native coronary artery, Chronic kidney disease, DVT (deep venous thrombosis) (HCC), Hypertension, Obesity, and HEYDI (obstructive sleep apnea).   Past Surgical History:   Procedure Laterality Date    MULTIPLE CORONARY ARTERY BYPASS ENDO VEIN HARVEST  2/16/2015    Performed by Ino Culver M.D. at SURGERY [facility]    TONSILLECTOMY         Exam:   BP (!) 162/88 (BP Location: Left arm, Patient Position: Sitting, BP Cuff Size: Adult)   Pulse (!) 57   Temp 36.6 °C (97.8 °F) (Temporal)   Resp 12   Ht 1.854 m (6' 1\")   Wt 99.8 kg (220 lb)   SpO2 97%  Body mass index is 29.03 kg/m².    Hearing good.    Dentition good  Alert, oriented in no acute distress.  Eye contact is good, speech goal directed, affect calm      Assessment and Plan. The following treatment and monitoring plan is recommended:    1. Medicare annual wellness visit, subsequent  Utd on     2. Encounter for abdominal aortic aneurysm (AAA) screening    - US-ABDOMINAL AORTA W/O DOPPLER; Future    3. Coronary artery disease involving native coronary artery of native heart without angina pectoris  The patient's current medical issue is well controlled on the current therapy with " no new symptoms or worsening      4. Benign essential HTN  Currently treated for HTN, taking meds with no CP or sob, monitors bp at home periodically. controlled      Past Medical History:   Diagnosis Date    CAD (coronary artery disease), native coronary artery     Chronic kidney disease     DVT (deep venous thrombosis) (HCC)     had dvt after harvest of thigh vein, goes to coumadin clini    Hypertension     Obesity     HEYDI (obstructive sleep apnea)      Past Surgical History:   Procedure Laterality Date    MULTIPLE CORONARY ARTERY BYPASS ENDO VEIN HARVEST  2/16/2015    Performed by Ino Culver M.D. at SURGERY [facility]    TONSILLECTOMY         Social History     Tobacco Use    Smoking status: Former     Types: Cigarettes    Smokeless tobacco: Never   Vaping Use    Vaping Use: Never used   Substance Use Topics    Alcohol use: Yes     Alcohol/week: 0.0 oz     Comment: Wine occ    Drug use: No       Family History   Problem Relation Age of Onset    Heart Disease Mother     Hypertension Mother     Heart Disease Father     Other Father         GI Bleeding    Hypertension Father          Current Outpatient Medications:     GARLIC PO, Take  by mouth., Disp: , Rfl:     amLODIPine (NORVASC) 5 MG Tab, Take 1 Tablet by mouth every day., Disp: 90 Tablet, Rfl: 3    atorvastatin (LIPITOR) 80 MG tablet, TAKE ONE TABLET BY MOUTH DAILY, Disp: 90 Tablet, Rfl: 2    carvedilol (COREG) 6.25 MG Tab, TAKE ONE TABLET BY MOUTH TWICE A DAY, Disp: 180 Tablet, Rfl: 2    lisinopril-hydrochlorothiazide (PRINZIDE) 20-25 MG per tablet, TAKE ONE TABLET BY MOUTH DAILY, Disp: 90 Tablet, Rfl: 2    Multiple Vitamins-Minerals (OCUVITE EXTRA PO), Take  by mouth., Disp: , Rfl:     Coenzyme Q10 (CO Q 10 PO), Take  by mouth., Disp: , Rfl:     aspirin 81 MG EC tablet, Take 1 Tab by mouth every day., Disp: 30 Tab, Rfl:     Patient was instructed on the use of medications, either prescriptions or OTC and informed on when the appropriate follow up  time period should be. In addition, patient was also instructed that should any acute worsening occur that they should notify this clinic asap or call 911.        Services suggested: No services needed at this time  Health Care Screening recommendations as per orders if indicated.  Referrals offered: PT/OT/Nutrition counseling/Behavioral Health/Smoking cessation as per orders if indicated.    Discussion today about general wellness and lifestyle habits:    Prevent falls and reduce trip hazards; Cautioned about securing or removing rugs.  Have a working fire alarm and carbon monoxide detector;   Engage in regular physical activity and social activities     Follow-up: No follow-ups on file.

## 2023-08-26 DIAGNOSIS — I10 BENIGN ESSENTIAL HTN: ICD-10-CM

## 2023-08-26 DIAGNOSIS — I25.10 CORONARY ARTERY DISEASE INVOLVING NATIVE CORONARY ARTERY OF NATIVE HEART WITHOUT ANGINA PECTORIS: ICD-10-CM

## 2023-08-26 DIAGNOSIS — E78.5 DYSLIPIDEMIA: ICD-10-CM

## 2023-08-26 DIAGNOSIS — I25.111 CORONARY ARTERY DISEASE INVOLVING NATIVE CORONARY ARTERY OF NATIVE HEART WITH ANGINA PECTORIS WITH DOCUMENTED SPASM (HCC): ICD-10-CM

## 2023-08-27 DIAGNOSIS — I10 BENIGN ESSENTIAL HTN: ICD-10-CM

## 2023-08-27 DIAGNOSIS — I25.111 CORONARY ARTERY DISEASE INVOLVING NATIVE CORONARY ARTERY OF NATIVE HEART WITH ANGINA PECTORIS WITH DOCUMENTED SPASM (HCC): ICD-10-CM

## 2023-08-28 NOTE — TELEPHONE ENCOUNTER
Is the patient due for a refill? Yes    Was the patient seen the past year? Yes    Date of last office visit: 67/23    Does the patient have an upcoming appointment?  No    Provider to refill:TW    Does the patients insurance require a 100 day supply?  No

## 2023-08-29 NOTE — TELEPHONE ENCOUNTER
Is the patient due for a refill? Yes    Was the patient seen the past year? Yes    Date of last office visit: 6/7/23    Does the patient have an upcoming appointment?  No    Provider to refill:TW    Does the patients insurance require a 100 day supply?  No

## 2023-08-30 ENCOUNTER — TELEPHONE (OUTPATIENT)
Dept: CARDIOLOGY | Facility: MEDICAL CENTER | Age: 73
End: 2023-08-30
Payer: MEDICARE

## 2023-08-30 DIAGNOSIS — E78.5 DYSLIPIDEMIA: ICD-10-CM

## 2023-08-30 DIAGNOSIS — I10 BENIGN ESSENTIAL HTN: ICD-10-CM

## 2023-08-30 DIAGNOSIS — I25.10 CORONARY ARTERY DISEASE INVOLVING NATIVE CORONARY ARTERY OF NATIVE HEART WITHOUT ANGINA PECTORIS: ICD-10-CM

## 2023-08-30 DIAGNOSIS — I25.111 CORONARY ARTERY DISEASE INVOLVING NATIVE CORONARY ARTERY OF NATIVE HEART WITH ANGINA PECTORIS WITH DOCUMENTED SPASM (HCC): ICD-10-CM

## 2023-08-30 RX ORDER — CARVEDILOL 6.25 MG/1
TABLET ORAL
Qty: 180 TABLET | Refills: 2 | Status: SHIPPED | OUTPATIENT
Start: 2023-08-30

## 2023-08-30 RX ORDER — ATORVASTATIN CALCIUM 80 MG/1
80 TABLET, FILM COATED ORAL DAILY
Qty: 90 TABLET | Refills: 2 | Status: SHIPPED | OUTPATIENT
Start: 2023-08-30

## 2023-08-30 RX ORDER — LISINOPRIL AND HYDROCHLOROTHIAZIDE 25; 20 MG/1; MG/1
1 TABLET ORAL DAILY
Qty: 90 TABLET | Refills: 2 | Status: SHIPPED | OUTPATIENT
Start: 2023-08-30

## 2023-08-30 NOTE — TELEPHONE ENCOUNTER
TW    Caller: Deniz Julio     Medication Name and Dosage: carvedilol (COREG) 6.25 MG , atorvastatin (LIPITOR) 80 MG  &  lisinopril-hydrochlorothiazide (PRINZIDE) 20-25 MG    Medication amount left: 0 left    Preferred Pharmacy: [facility]- On file     Other questions (Topic): Pt stated that the pharmacy said the office denied refill please advise    Callback Number (Will only call for issues): [phone] (home)      Thank you,     Timmy DAY

## 2023-09-07 RX ORDER — LISINOPRIL AND HYDROCHLOROTHIAZIDE 25; 20 MG/1; MG/1
TABLET ORAL
Qty: 90 TABLET | Refills: 3 | Status: SHIPPED | OUTPATIENT
Start: 2023-09-07

## 2023-09-07 RX ORDER — CARVEDILOL 6.25 MG/1
TABLET ORAL
Qty: 180 TABLET | Refills: 3 | Status: SHIPPED | OUTPATIENT
Start: 2023-09-07

## 2023-09-07 RX ORDER — ATORVASTATIN CALCIUM 80 MG/1
TABLET, FILM COATED ORAL
Qty: 90 TABLET | Refills: 3 | Status: SHIPPED | OUTPATIENT
Start: 2023-09-07

## 2024-04-29 ENCOUNTER — OFFICE VISIT (OUTPATIENT)
Dept: CARDIOLOGY | Facility: MEDICAL CENTER | Age: 74
End: 2024-04-29
Attending: INTERNAL MEDICINE
Payer: MEDICARE

## 2024-04-29 VITALS
WEIGHT: 205 LBS | HEART RATE: 48 BPM | HEIGHT: 73 IN | OXYGEN SATURATION: 100 % | DIASTOLIC BLOOD PRESSURE: 42 MMHG | SYSTOLIC BLOOD PRESSURE: 116 MMHG | BODY MASS INDEX: 27.17 KG/M2 | RESPIRATION RATE: 16 BRPM

## 2024-04-29 DIAGNOSIS — I25.10 CORONARY ARTERY DISEASE INVOLVING NATIVE CORONARY ARTERY OF NATIVE HEART WITHOUT ANGINA PECTORIS: ICD-10-CM

## 2024-04-29 DIAGNOSIS — I10 ESSENTIAL HYPERTENSION: ICD-10-CM

## 2024-04-29 DIAGNOSIS — E78.5 DYSLIPIDEMIA: ICD-10-CM

## 2024-04-29 PROCEDURE — 99212 OFFICE O/P EST SF 10 MIN: CPT | Performed by: INTERNAL MEDICINE

## 2024-04-29 NOTE — PROGRESS NOTES
Subjective:   Deniz Julio is a 74 y.o. male who presents today for followup of ischemic cardiomyopathy with recovered EF and CAD with three-vessel CAD status post CABG on 2/16/15 (3 vessels), CKD, postoperative DVT. He is feeling well, NYHA class I, walks 3 miles per day and continues to lose significant amounts of weight due to diet and exercise. His ejection fraction has normalized. Tolerating medical therapy.     Since last visit he has successfully lost more than 20 pounds with diet and exercise and feels well.  He even went down on his antihypertensive therapy.  Obviously no exertional symptoms as well.    Past Surgical History:   Procedure Laterality Date    MULTIPLE CORONARY ARTERY BYPASS ENDO VEIN HARVEST  2/16/2015    Performed by Ino Culver M.D. at SURGERY [facility]    TONSILLECTOMY       Family History   Problem Relation Age of Onset    Heart Disease Mother     Hypertension Mother     Heart Disease Father     Other Father         GI Bleeding    Hypertension Father      Social History     Tobacco Use   Smoking Status Former    Types: Cigarettes   Smokeless Tobacco Never     Allergies   Allergen Reactions    Tetracycline      Unknown rxn.    Tripelennamine      Unknown rxn.     Outpatient Encounter Medications as of 4/29/2024   Medication Sig Dispense Refill    TURMERIC PO Take  by mouth.      atorvastatin (LIPITOR) 80 MG tablet TAKE ONE TABLET BY MOUTH DAILY 90 Tablet 3    carvedilol (COREG) 6.25 MG Tab TAKE ONE TABLET BY MOUTH TWICE A  Tablet 3    lisinopril-hydrochlorothiazide (PRINZIDE) 20-25 MG per tablet TAKE ONE TABLET BY MOUTH DAILY 90 Tablet 3    GARLIC PO Take  by mouth.      amLODIPine (NORVASC) 5 MG Tab Take 1 Tablet by mouth every day. (Patient taking differently: Take 2.5 mg by mouth every day.) 90 Tablet 3    Multiple Vitamins-Minerals (OCUVITE EXTRA PO) Take  by mouth.      Coenzyme Q10 (CO Q 10 PO) Take  by mouth.      aspirin 81 MG EC tablet Take 1 Tab by mouth every  "day. 30 Tab     [DISCONTINUED] atorvastatin (LIPITOR) 80 MG tablet Take 1 Tablet by mouth every day. 90 Tablet 2    [DISCONTINUED] carvedilol (COREG) 6.25 MG Tab TAKE ONE TABLET BY MOUTH TWICE A  Tablet 2    [DISCONTINUED] lisinopril-hydrochlorothiazide (PRINZIDE) 20-25 MG per tablet Take 1 Tablet by mouth every day. 90 Tablet 2     No facility-administered encounter medications on file as of 4/29/2024.     Review of Systems   All other systems reviewed and are negative.       Objective:   /42 (BP Location: Left arm, Patient Position: Sitting)   Pulse (!) 48   Resp 16   Ht 1.854 m (6' 1\")   Wt 93 kg (205 lb)   SpO2 100%   BMI 27.05 kg/m²     Physical Exam  Constitutional:       General: He is not in acute distress.     Appearance: He is well-developed. He is not diaphoretic.   HENT:      Head: Normocephalic and atraumatic.   Eyes:      General: No scleral icterus.     Conjunctiva/sclera: Conjunctivae normal.      Pupils: Pupils are equal, round, and reactive to light.   Neck:      Thyroid: No thyromegaly.      Vascular: No JVD.      Trachea: No tracheal deviation.   Cardiovascular:      Rate and Rhythm: Normal rate and regular rhythm.      Chest Wall: PMI is not displaced.      Pulses:           Carotid pulses are 2+ on the right side and 2+ on the left side.       Radial pulses are 2+ on the right side and 2+ on the left side.        Dorsalis pedis pulses are 2+ on the right side and 2+ on the left side.        Posterior tibial pulses are 2+ on the right side and 2+ on the left side.      Heart sounds: S1 normal. Murmur (2/6 systolic murmur) heard.      No friction rub. No gallop.   Pulmonary:      Effort: Pulmonary effort is normal.      Breath sounds: Normal breath sounds. No wheezing or rales.   Abdominal:      General: Bowel sounds are normal. There is no distension.      Palpations: Abdomen is soft. There is no pulsatile mass.      Tenderness: There is no abdominal tenderness. There is no " guarding.   Skin:     General: Skin is warm and dry.      Findings: No erythema or rash.   Neurological:      Mental Status: He is alert and oriented to person, place, and time.      Cranial Nerves: No cranial nerve deficit.   Psychiatric:         Behavior: Behavior normal.         Thought Content: Thought content normal.       LABS:  Lab Results   Component Value Date/Time    CHOLSTRLTOT 155 11/22/2021 07:05 AM    LDL 92 11/22/2021 07:05 AM    HDL 43 11/22/2021 07:05 AM    TRIGLYCERIDE 102 11/22/2021 07:05 AM       Lab Results   Component Value Date/Time    WBC 6.1 08/27/2019 07:35 AM    RBC 4.58 (L) 08/27/2019 07:35 AM    HEMOGLOBIN 14.7 08/27/2019 07:35 AM    HEMATOCRIT 45.2 08/27/2019 07:35 AM    MCV 98.7 (H) 08/27/2019 07:35 AM    NEUTSPOLYS 36.7 (L) 03/16/2015 11:40 AM    LYMPHOCYTES 47.6 (H) 03/16/2015 11:40 AM    MONOCYTES 9.4 03/16/2015 11:40 AM    EOSINOPHILS 4.9 03/16/2015 11:40 AM    BASOPHILS 1.3 03/16/2015 11:40 AM     Lab Results   Component Value Date/Time    SODIUM 134 (L) 06/09/2023 07:50 AM    SODIUM 142 11/22/2021 07:05 AM    POTASSIUM 4.6 06/09/2023 07:50 AM    POTASSIUM 5.2 11/22/2021 07:05 AM    CHLORIDE 102 06/09/2023 07:50 AM    CHLORIDE 110 11/22/2021 07:05 AM    CO2 25 06/09/2023 07:50 AM    CO2 22 11/22/2021 07:05 AM    GLUCOSE 80 06/09/2023 07:50 AM    GLUCOSE 88 11/22/2021 07:05 AM    BUN 39 (H) 06/09/2023 07:50 AM    BUN 38 (H) 11/22/2021 07:05 AM    CREATININE 1.80 (H) 06/09/2023 07:50 AM    CREATININE 1.39 11/22/2021 07:05 AM    GLOMRATE 37 (L) 06/09/2023 07:50 AM         Lab Results   Component Value Date/Time    ALKPHOSPHAT 76 05/22/2023 08:03 AM    ALKPHOSPHAT 75 11/22/2021 07:05 AM    ASTSGOT 13 (L) 05/22/2023 08:03 AM    ASTSGOT 12 11/22/2021 07:05 AM    ALTSGPT 14 (L) 05/22/2023 08:03 AM    ALTSGPT 16 11/22/2021 07:05 AM    TBILIRUBIN 0.8 05/22/2023 08:03 AM    TBILIRUBIN 0.6 11/22/2021 07:05 AM      Lab Results   Component Value Date/Time    BNPBTYPENAT 1182 (H) 03/13/2015  "11:15 AM      No results found for: \"TSH\"  Lab Results   Component Value Date/Time    PROTHROMBTM 23.4 (H) 03/16/2015 11:40 AM    INR 2.2 12/29/2015 12:00 AM          Imaging reviewed  Assessment:     1. Coronary artery disease involving native coronary artery of native heart without angina pectoris        2. Essential hypertension        3. Dyslipidemia  Comp Metabolic Panel    Lipid Profile          Medical Decision Making:  Today's Assessment / Status / Plan:     Doing well.  Mild aortic insufficiency on echocardiogram but otherwise normal.  Blood pressure is improved dramatically with lifestyle changes and weight loss.  Goal LDL less than 70 due for check.  Recommend follow-up echocardiogram next year for his insufficiency.  Blood pressure is well-controlled.  Follow-up routinely    "

## 2024-05-16 ENCOUNTER — TELEPHONE (OUTPATIENT)
Dept: CARDIOLOGY | Facility: MEDICAL CENTER | Age: 74
End: 2024-05-16
Payer: MEDICARE

## 2024-05-16 DIAGNOSIS — E78.5 DYSLIPIDEMIA: ICD-10-CM

## 2024-05-16 DIAGNOSIS — I25.10 CORONARY ARTERY DISEASE INVOLVING NATIVE CORONARY ARTERY OF NATIVE HEART WITHOUT ANGINA PECTORIS: ICD-10-CM

## 2024-05-16 DIAGNOSIS — I10 BENIGN ESSENTIAL HTN: ICD-10-CM

## 2024-05-16 DIAGNOSIS — I25.111 CORONARY ARTERY DISEASE INVOLVING NATIVE CORONARY ARTERY OF NATIVE HEART WITH ANGINA PECTORIS WITH DOCUMENTED SPASM (HCC): ICD-10-CM

## 2024-05-16 RX ORDER — ATORVASTATIN CALCIUM 80 MG/1
80 TABLET, FILM COATED ORAL DAILY
Qty: 90 TABLET | Refills: 3 | Status: SHIPPED | OUTPATIENT
Start: 2024-05-16

## 2024-05-16 RX ORDER — LISINOPRIL AND HYDROCHLOROTHIAZIDE 25; 20 MG/1; MG/1
1 TABLET ORAL DAILY
Qty: 90 TABLET | Refills: 3 | Status: SHIPPED | OUTPATIENT
Start: 2024-05-16

## 2024-05-16 RX ORDER — CARVEDILOL 6.25 MG/1
TABLET ORAL
Qty: 180 TABLET | Refills: 3 | Status: SHIPPED | OUTPATIENT
Start: 2024-05-16

## 2024-05-16 NOTE — TELEPHONE ENCOUNTER
TW    Caller: Deniz Julio    Topic/issue: Patient called pharmacy to request refill but they told him that all three were cancelled. Patient only has one weeks worth and is asking for this to be resolved and sent over to [facility] on file.    Medications:    CARVEDILOL  LISINOPRIL   ATORVASTATIN    Callback Number: [phone] (home)     Thank you,  Elizabeth MANE

## 2024-07-23 DIAGNOSIS — I10 ESSENTIAL HYPERTENSION: ICD-10-CM

## 2024-07-29 RX ORDER — AMLODIPINE BESYLATE 5 MG/1
5 TABLET ORAL DAILY
Qty: 90 TABLET | Refills: 2 | Status: SHIPPED | OUTPATIENT
Start: 2024-07-29

## 2024-09-11 ENCOUNTER — OFFICE VISIT (OUTPATIENT)
Dept: MEDICAL GROUP | Facility: PHYSICIAN GROUP | Age: 74
End: 2024-09-11
Payer: MEDICARE

## 2024-09-11 VITALS
RESPIRATION RATE: 20 BRPM | TEMPERATURE: 96.6 F | OXYGEN SATURATION: 98 % | WEIGHT: 195.4 LBS | SYSTOLIC BLOOD PRESSURE: 122 MMHG | DIASTOLIC BLOOD PRESSURE: 70 MMHG | BODY MASS INDEX: 25.9 KG/M2 | HEIGHT: 73 IN | HEART RATE: 55 BPM

## 2024-09-11 DIAGNOSIS — Z00.00 MEDICARE ANNUAL WELLNESS VISIT, SUBSEQUENT: ICD-10-CM

## 2024-09-11 DIAGNOSIS — I25.10 CORONARY ARTERY DISEASE INVOLVING NATIVE CORONARY ARTERY OF NATIVE HEART WITHOUT ANGINA PECTORIS: ICD-10-CM

## 2024-09-11 DIAGNOSIS — I10 BENIGN ESSENTIAL HTN: ICD-10-CM

## 2024-09-11 PROCEDURE — G0439 PPPS, SUBSEQ VISIT: HCPCS | Performed by: FAMILY MEDICINE

## 2024-09-11 PROCEDURE — 3074F SYST BP LT 130 MM HG: CPT | Performed by: FAMILY MEDICINE

## 2024-09-11 PROCEDURE — 3078F DIAST BP <80 MM HG: CPT | Performed by: FAMILY MEDICINE

## 2024-09-11 ASSESSMENT — ACTIVITIES OF DAILY LIVING (ADL): BATHING_REQUIRES_ASSISTANCE: 0

## 2024-09-11 ASSESSMENT — ENCOUNTER SYMPTOMS: GENERAL WELL-BEING: EXCELLENT

## 2024-09-11 ASSESSMENT — PATIENT HEALTH QUESTIONNAIRE - PHQ9: CLINICAL INTERPRETATION OF PHQ2 SCORE: 0

## 2024-09-11 NOTE — PROGRESS NOTES
Chief Complaint   Patient presents with    Annual Wellness Visit       HPI:  Deniz Julio is a 74 y.o. here for Medicare Annual Wellness Visit     Patient Active Problem List    Diagnosis Date Noted    Benign essential HTN 01/05/2017    Obstructive sleep apnea 08/22/2016    S/P CABG x 3 02/23/2015    Coronary artery disease involving native coronary artery of native heart without angina pectoris 02/23/2015    Dyslipidemia 02/23/2015    Chronic kidney disease 02/23/2015       Current Outpatient Medications   Medication Sig Dispense Refill    amLODIPine (NORVASC) 5 MG Tab TAKE 1 TABLET BY MOUTH DAILY 90 Tablet 2    carvedilol (COREG) 6.25 MG Tab TAKE ONE TABLET BY MOUTH TWICE A  Tablet 3    atorvastatin (LIPITOR) 80 MG tablet Take 1 Tablet by mouth every day. 90 Tablet 3    lisinopril-hydrochlorothiazide (PRINZIDE) 20-25 MG per tablet Take 1 Tablet by mouth every day. 90 Tablet 3    TURMERIC PO Take  by mouth.      GARLIC PO Take  by mouth.      Multiple Vitamins-Minerals (OCUVITE EXTRA PO) Take  by mouth.      Coenzyme Q10 (CO Q 10 PO) Take  by mouth.      aspirin 81 MG EC tablet Take 1 Tab by mouth every day. 30 Tab      No current facility-administered medications for this visit.          Current supplements as per medication list.     Allergies: Tetracycline and Tripelennamine    Current social contact/activities:      He  reports that he has quit smoking. His smoking use included cigarettes. He has never used smokeless tobacco. He reports current alcohol use. He reports that he does not use drugs.  Counseling given: Not Answered      ROS:    Gait: Uses no assistive device  Ostomy: No  Other tubes: No  Amputations: No  Chronic oxygen use: No  Last eye exam: 2023  Wears hearing aids: No   : Denies any urinary leakage during the last 6 months    Screening:    Depression Screening  Little interest or pleasure in doing things?  0 - not at all  Feeling down, depressed , or hopeless? 0 - not at all  Patient  Health Questionnaire Score: 0     If depressive symptoms identified deferred to follow up visit unless specifically addressed in assessment and plan.    Interpretation of PHQ-9 Total Score   Score Severity   1-4 No Depression   5-9 Mild Depression   10-14 Moderate Depression   15-19 Moderately Severe Depression   20-27 Severe Depression    Screening for Cognitive Impairment  Do you or any of your friends or family members have any concern about your memory? No  Three Minute Recall (Leader, Season, Table) 3/3    Spencer clock face with all 12 numbers and set the hands to show 10 minutes after 11.  Yes    Cognitive concerns identified deferred for follow up unless specifically addressed in assessment and plan.    Fall Risk Assessment  Has the patient had two or more falls in the last year or any fall with injury in the last year?  No    Safety Assessment  Do you always wear your seatbelt?  Yes  Any changes to home needed to function safely? No  Difficulty hearing.  No  Patient counseled about all safety risks that were identified.    Functional Assessment ADLs  Are there any barriers preventing you from cooking for yourself or meeting nutritional needs?  No.    Are there any barriers preventing you from driving safely or obtaining transportation?  No.    Are there any barriers preventing you from using a telephone or calling for help?  No    Are there any barriers preventing you from shopping?  No.    Are there any barriers preventing you from taking care of your own finances?  No    Are there any barriers preventing you from managing your medications?  No    Are there any barriers preventing you from showering, bathing or dressing yourself? No    Are there any barriers preventing you from doing housework or laundry? No  Are there any barriers preventing you from using the toilet?No  Are you currently engaging in any exercise or physical activity?  Yes. Goes to the gym for an hour 4 times a week    Self-Assessment of  Health  What is your perception of your health? Excellent    Do you sleep more than six hours a night? Yes    In the past 7 days, how much did pain keep you from doing your normal work? None    Do you spend quality time with family or friends (virtually or in person)? Yes    Do you usually eat a heart healthy diet that constists of a variety of fruits, vegetables, whole grains and fiber? Yes    Do you eat foods high in fat and/or Fast Food more than three times per week? No    How concerned are you that your medical conditions are not being well managed? Not at all    Are you worried that in the next 2 months, you may not have stable housing that you own, rent, or stay in as part of a household? No      Advance Care Planning  Do you have an Advance Directive, Living Will, Durable Power of , or POLST? Yes  Advance Directive Living Will     is not on file - instructed patient to bring in a copy to scan into their chart      Health Maintenance Summary            Overdue - Annual Wellness Visit (Yearly) Overdue since 6/12/2024 06/12/2023  Done    06/12/2023  Visit Dx: Medicare annual wellness visit, subsequent    06/07/2022  Done    06/07/2022  Visit Dx: Medicare annual wellness visit, subsequent    04/05/2021  Done    Only the first 5 history entries have been loaded, but more history exists.              Overdue - Influenza Vaccine (1) Overdue since 9/1/2024 01/05/2017  Imm Admin: Influenza Vaccine Adult HD              Postponed - Zoster (Shingles) Vaccines (1 of 2) Postponed until 2/11/2025      No completion history exists for this topic.              Postponed - COVID-19 Vaccine (4 - 2023-24 season) Postponed until 9/11/2025 01/13/2022  Imm Admin: MODERNA SARS-COV-2 VACCINE (12+)    03/13/2021  Imm Admin: MODERNA SARS-COV-2 VACCINE (12+)    02/13/2021  Imm Admin: MODERNA SARS-COV-2 VACCINE (12+)              IMM DTaP/Tdap/Td Vaccine (2 - Td or Tdap) Next due on 7/12/2033 07/12/2023   Outside Immunization: Tdap              Pneumococcal Vaccine: 65+ Years (Series Information) Completed      01/05/2017  Imm Admin: Pneumococcal Conjugate Vaccine (Prevnar/PCV-13)    02/06/2015  Imm Admin: Pneumococcal polysaccharide vaccine (PPSV-23)              Hepatitis A Vaccine (Hep A) (Series Information) Aged Out      No completion history exists for this topic.              Hepatitis B Vaccine (Hep B) (Series Information) Aged Out      No completion history exists for this topic.              HPV Vaccines (Series Information) Aged Out      No completion history exists for this topic.              Polio Vaccine (Inactivated Polio) (Series Information) Aged Out      No completion history exists for this topic.              Meningococcal Immunization (Series Information) Aged Out      No completion history exists for this topic.              Discontinued - Colorectal Cancer Screening  Discontinued        Frequency changed to Never automatically (Topic No Longer Applies)    03/13/2015  Colonoscopy (Patient Declined)              Discontinued - Abdominal Aortic Aneurysm (AAA) Screening  Discontinued        Frequency changed to Never automatically (Topic No Longer Applies)    03/11/2024  Outside Procedure: MN US, RETROPERITNL ABD,  LTD              Discontinued - Hepatitis C Screening  Discontinued      No completion history exists for this topic.                    Patient Care Team:  Joshua Fu M.D. as PCP - General (Family Medicine)  [facility] as Respiratory Therapist        Social History     Tobacco Use    Smoking status: Former     Types: Cigarettes    Smokeless tobacco: Never   Vaping Use    Vaping status: Never Used   Substance Use Topics    Alcohol use: Yes     Alcohol/week: 0.0 oz     Comment: Wine occ    Drug use: No     Family History   Problem Relation Age of Onset    Heart Disease Mother     Hypertension Mother     Heart Disease Father     Other Father         GI Bleeding    Hypertension Father   "    He  has a past medical history of CAD (coronary artery disease), native coronary artery, Chronic kidney disease, DVT (deep venous thrombosis) (HCC), Hypertension, Obesity, and HEYDI (obstructive sleep apnea).   Past Surgical History:   Procedure Laterality Date    MULTIPLE CORONARY ARTERY BYPASS ENDO VEIN HARVEST  2/16/2015    Performed by Ino Culver M.D. at SURGERY [facility]    TONSILLECTOMY         Exam:   Pulse (!) 55   Temp 35.9 °C (96.6 °F) (Temporal)   Resp 20   Ht 1.854 m (6' 1\")   Wt 88.6 kg (195 lb 6.4 oz)   SpO2 98%  Body mass index is 25.78 kg/m².    Hearing excellent.    Dentition good  Alert, oriented in no acute distress.  Eye contact is good, speech goal directed, affect calm    Assessment and Plan. The following treatment and monitoring plan is recommended:    1. Medicare annual wellness visit, subsequent  Utd on     2. Benign essential HTN  Currently treated for HTN, taking meds with no CP or sob, monitors bp at home periodically. controlled      3. Coronary artery disease involving native coronary artery of native heart without angina pectoris  The patient's current medical issue is well controlled on the current therapy with no new symptoms or worsening      Past Medical History:   Diagnosis Date    CAD (coronary artery disease), native coronary artery     Chronic kidney disease     DVT (deep venous thrombosis) (HCC)     had dvt after harvest of thigh vein, goes to coumadin clini    Hypertension     Obesity     HEYDI (obstructive sleep apnea)      Past Surgical History:   Procedure Laterality Date    MULTIPLE CORONARY ARTERY BYPASS ENDO VEIN HARVEST  2/16/2015    Performed by Ino Culver M.D. at SURGERY [facility]    TONSILLECTOMY         Social History     Tobacco Use    Smoking status: Former     Types: Cigarettes    Smokeless tobacco: Never   Vaping Use    Vaping status: Never Used   Substance Use Topics    Alcohol use: Yes     Alcohol/week: 0.0 oz     Comment: Wine " occ    Drug use: No       Family History   Problem Relation Age of Onset    Heart Disease Mother     Hypertension Mother     Heart Disease Father     Other Father         GI Bleeding    Hypertension Father          Current Outpatient Medications:     amLODIPine (NORVASC) 5 MG Tab, TAKE 1 TABLET BY MOUTH DAILY, Disp: 90 Tablet, Rfl: 2    carvedilol (COREG) 6.25 MG Tab, TAKE ONE TABLET BY MOUTH TWICE A DAY, Disp: 180 Tablet, Rfl: 3    atorvastatin (LIPITOR) 80 MG tablet, Take 1 Tablet by mouth every day., Disp: 90 Tablet, Rfl: 3    lisinopril-hydrochlorothiazide (PRINZIDE) 20-25 MG per tablet, Take 1 Tablet by mouth every day., Disp: 90 Tablet, Rfl: 3    TURMERIC PO, Take  by mouth., Disp: , Rfl:     GARLIC PO, Take  by mouth., Disp: , Rfl:     Multiple Vitamins-Minerals (OCUVITE EXTRA PO), Take  by mouth., Disp: , Rfl:     Coenzyme Q10 (CO Q 10 PO), Take  by mouth., Disp: , Rfl:     aspirin 81 MG EC tablet, Take 1 Tab by mouth every day., Disp: 30 Tab, Rfl:     Patient was instructed on the use of medications, either prescriptions or OTC and informed on when the appropriate follow up time period should be. In addition, patient was also instructed that should any acute worsening occur that they should notify this clinic asap or call 911.        Services suggested: No services needed at this time  Health Care Screening: Age-appropriate preventive services recommended by USPTF and ACIP covered by Medicare were discussed today. Services ordered if indicated and agreed upon by the patient.  Referrals offered: Community-based lifestyle interventions to reduce health risks and promote self-management and wellness, fall prevention, nutrition, physical activity, tobacco-use cessation, weight loss, and mental health services as per orders if indicated.    Discussion today about general wellness and lifestyle habits:    Prevent falls and reduce trip hazards; Cautioned about securing or removing rugs.  Have a working fire alarm  and carbon monoxide detector;   Engage in regular physical activity and social activities     Follow-up: No follow-ups on file.

## 2025-03-21 DIAGNOSIS — I25.10 CORONARY ARTERY DISEASE INVOLVING NATIVE CORONARY ARTERY OF NATIVE HEART WITHOUT ANGINA PECTORIS: ICD-10-CM

## 2025-05-06 ENCOUNTER — OFFICE VISIT (OUTPATIENT)
Dept: CARDIOLOGY | Facility: MEDICAL CENTER | Age: 75
End: 2025-05-06
Attending: INTERNAL MEDICINE
Payer: MEDICARE

## 2025-05-06 VITALS
BODY MASS INDEX: 25.05 KG/M2 | DIASTOLIC BLOOD PRESSURE: 70 MMHG | WEIGHT: 189 LBS | SYSTOLIC BLOOD PRESSURE: 128 MMHG | HEIGHT: 73 IN | OXYGEN SATURATION: 99 % | RESPIRATION RATE: 18 BRPM | HEART RATE: 54 BPM

## 2025-05-06 DIAGNOSIS — E78.2 MIXED HYPERLIPIDEMIA: ICD-10-CM

## 2025-05-06 DIAGNOSIS — I25.10 CORONARY ARTERY DISEASE INVOLVING NATIVE CORONARY ARTERY OF NATIVE HEART WITHOUT ANGINA PECTORIS: ICD-10-CM

## 2025-05-06 DIAGNOSIS — I49.3 PREMATURE VENTRICULAR CONTRACTIONS: ICD-10-CM

## 2025-05-06 DIAGNOSIS — I35.1 NONRHEUMATIC AORTIC VALVE INSUFFICIENCY: ICD-10-CM

## 2025-05-06 DIAGNOSIS — I25.111 CORONARY ARTERY DISEASE INVOLVING NATIVE CORONARY ARTERY OF NATIVE HEART WITH ANGINA PECTORIS WITH DOCUMENTED SPASM (HCC): ICD-10-CM

## 2025-05-06 DIAGNOSIS — I10 BENIGN ESSENTIAL HTN: ICD-10-CM

## 2025-05-06 DIAGNOSIS — I10 ESSENTIAL HYPERTENSION: ICD-10-CM

## 2025-05-06 DIAGNOSIS — E78.5 DYSLIPIDEMIA: ICD-10-CM

## 2025-05-06 PROCEDURE — G2211 COMPLEX E/M VISIT ADD ON: HCPCS | Performed by: INTERNAL MEDICINE

## 2025-05-06 PROCEDURE — 99213 OFFICE O/P EST LOW 20 MIN: CPT | Performed by: INTERNAL MEDICINE

## 2025-05-06 PROCEDURE — 99214 OFFICE O/P EST MOD 30 MIN: CPT | Performed by: INTERNAL MEDICINE

## 2025-05-06 RX ORDER — CARVEDILOL 6.25 MG/1
TABLET ORAL
Qty: 180 TABLET | Refills: 3 | Status: SHIPPED | OUTPATIENT
Start: 2025-05-06 | End: 2025-05-08

## 2025-05-06 RX ORDER — LISINOPRIL AND HYDROCHLOROTHIAZIDE 20; 25 MG/1; MG/1
1 TABLET ORAL DAILY
Qty: 90 TABLET | Refills: 3 | Status: SHIPPED | OUTPATIENT
Start: 2025-05-06 | End: 2025-05-08

## 2025-05-06 RX ORDER — ATORVASTATIN CALCIUM 80 MG/1
80 TABLET, FILM COATED ORAL DAILY
Qty: 90 TABLET | Refills: 3 | Status: SHIPPED | OUTPATIENT
Start: 2025-05-06 | End: 2025-05-08

## 2025-05-06 RX ORDER — AMLODIPINE BESYLATE 5 MG/1
5 TABLET ORAL DAILY
Qty: 90 TABLET | Refills: 3 | Status: SHIPPED | OUTPATIENT
Start: 2025-05-06

## 2025-05-06 NOTE — PROGRESS NOTES
Subjective:   Deniz Julio is a 75 y.o. male who presents today for followup of ischemic cardiomyopathy with recovered EF and CAD with three-vessel CAD status post CABG 2/16/15 (3 vessels), CKD, postoperative DVT.     Doing extremely well continuing to be very active with no exertional symptoms.  Has lost further weight purposefully because he feels better when he is lighter.  Blood pressure is well-controlled lipid profile is excellent medical therapy is appropriate.    Past Surgical History:   Procedure Laterality Date    MULTIPLE CORONARY ARTERY BYPASS ENDO VEIN HARVEST  2/16/2015    Performed by Ino Culver M.D. at SURGERY [facility]    TONSILLECTOMY       Family History   Problem Relation Age of Onset    Heart Disease Mother     Hypertension Mother     Heart Disease Father     Other Father         GI Bleeding    Hypertension Father      Social History     Tobacco Use   Smoking Status Former    Types: Cigarettes   Smokeless Tobacco Never     Allergies   Allergen Reactions    Tetracycline      Unknown rxn.    Tripelennamine      Unknown rxn.     Outpatient Encounter Medications as of 5/6/2025   Medication Sig Dispense Refill    Calcium Carbonate (CALCIUM 500 PO) Take 500 mg by mouth. Twice a week      Probiotic Product (PROBIOTIC DAILY PO) Take  by mouth.      carvedilol (COREG) 6.25 MG Tab TAKE ONE TABLET BY MOUTH TWICE A  Tablet 3    atorvastatin (LIPITOR) 80 MG tablet Take 1 Tablet by mouth every day. 90 Tablet 3    lisinopril-hydrochlorothiazide (PRINZIDE) 20-25 MG per tablet Take 1 Tablet by mouth every day. 90 Tablet 3    amLODIPine (NORVASC) 5 MG Tab Take 1 Tablet by mouth every day. 90 Tablet 3    TURMERIC PO Take  by mouth.      GARLIC PO Take  by mouth.      Multiple Vitamins-Minerals (OCUVITE EXTRA PO) Take  by mouth.      Coenzyme Q10 (CO Q 10 PO) Take  by mouth.      aspirin 81 MG EC tablet Take 1 Tab by mouth every day. 30 Tab     [DISCONTINUED] amLODIPine (NORVASC) 5 MG Tab TAKE 1  "TABLET BY MOUTH DAILY (Patient taking differently: Take 2.5 mg by mouth every day.) 90 Tablet 2    [DISCONTINUED] carvedilol (COREG) 6.25 MG Tab TAKE ONE TABLET BY MOUTH TWICE A  Tablet 3    [DISCONTINUED] atorvastatin (LIPITOR) 80 MG tablet Take 1 Tablet by mouth every day. 90 Tablet 3    [DISCONTINUED] lisinopril-hydrochlorothiazide (PRINZIDE) 20-25 MG per tablet Take 1 Tablet by mouth every day. 90 Tablet 3     No facility-administered encounter medications on file as of 5/6/2025.     Review of Systems   All other systems reviewed and are negative.       Objective:   /70 (BP Location: Left arm, Patient Position: Sitting)   Pulse (!) 54   Resp 18   Ht 1.854 m (6' 1\")   Wt 85.7 kg (189 lb)   SpO2 99%   BMI 24.94 kg/m²     Physical Exam  Constitutional:       General: He is not in acute distress.     Appearance: He is well-developed. He is not diaphoretic.   HENT:      Head: Normocephalic and atraumatic.   Eyes:      General: No scleral icterus.     Conjunctiva/sclera: Conjunctivae normal.      Pupils: Pupils are equal, round, and reactive to light.   Neck:      Thyroid: No thyromegaly.      Vascular: No JVD.      Trachea: No tracheal deviation.   Cardiovascular:      Rate and Rhythm: Normal rate and regular rhythm.      Chest Wall: PMI is not displaced.      Pulses:           Carotid pulses are 2+ on the right side and 2+ on the left side.       Radial pulses are 2+ on the right side and 2+ on the left side.        Dorsalis pedis pulses are 2+ on the right side and 2+ on the left side.        Posterior tibial pulses are 2+ on the right side and 2+ on the left side.      Heart sounds: S1 normal. Murmur (2/6 systolic murmur) heard.      No friction rub. No gallop.   Pulmonary:      Effort: Pulmonary effort is normal.      Breath sounds: Normal breath sounds. No wheezing or rales.   Abdominal:      General: Bowel sounds are normal. There is no distension.      Palpations: Abdomen is soft. There is " no pulsatile mass.      Tenderness: There is no abdominal tenderness. There is no guarding.   Skin:     General: Skin is warm and dry.      Findings: No erythema or rash.   Neurological:      Mental Status: He is alert and oriented to person, place, and time.      Cranial Nerves: No cranial nerve deficit.   Psychiatric:         Behavior: Behavior normal.         Thought Content: Thought content normal.       LABS:  Lab Results   Component Value Date/Time    CHOLSTRLTOT 155 11/22/2021 07:05 AM    LDL 92 11/22/2021 07:05 AM    HDL 43 11/22/2021 07:05 AM    TRIGLYCERIDE 102 11/22/2021 07:05 AM       Lab Results   Component Value Date/Time    WBC 6.1 08/27/2019 07:35 AM    RBC 4.58 (L) 08/27/2019 07:35 AM    HEMOGLOBIN 14.7 08/27/2019 07:35 AM    HEMATOCRIT 45.2 08/27/2019 07:35 AM    MCV 98.7 (H) 08/27/2019 07:35 AM    NEUTSPOLYS 36.7 (L) 03/16/2015 11:40 AM    LYMPHOCYTES 47.6 (H) 03/16/2015 11:40 AM    MONOCYTES 9.4 03/16/2015 11:40 AM    EOSINOPHILS 4.9 03/16/2015 11:40 AM    BASOPHILS 1.3 03/16/2015 11:40 AM     Lab Results   Component Value Date/Time    SODIUM 134 (L) 06/09/2023 07:50 AM    SODIUM 142 11/22/2021 07:05 AM    POTASSIUM 4.6 06/09/2023 07:50 AM    POTASSIUM 5.2 11/22/2021 07:05 AM    CHLORIDE 102 06/09/2023 07:50 AM    CHLORIDE 110 11/22/2021 07:05 AM    CO2 25 06/09/2023 07:50 AM    CO2 22 11/22/2021 07:05 AM    GLUCOSE 80 06/09/2023 07:50 AM    GLUCOSE 88 11/22/2021 07:05 AM    BUN 39 (H) 06/09/2023 07:50 AM    BUN 38 (H) 11/22/2021 07:05 AM    CREATININE 1.80 (H) 06/09/2023 07:50 AM    CREATININE 1.39 11/22/2021 07:05 AM    GLOMRATE 37 (L) 06/09/2023 07:50 AM         Lab Results   Component Value Date/Time    ALKPHOSPHAT 76 05/22/2023 08:03 AM    ALKPHOSPHAT 75 11/22/2021 07:05 AM    ASTSGOT 13 (L) 05/22/2023 08:03 AM    ASTSGOT 12 11/22/2021 07:05 AM    ALTSGPT 14 (L) 05/22/2023 08:03 AM    ALTSGPT 16 11/22/2021 07:05 AM    TBILIRUBIN 0.8 05/22/2023 08:03 AM    TBILIRUBIN 0.6 11/22/2021 07:05 AM  "     Lab Results   Component Value Date/Time    BNPBTYPENAT 1182 (H) 03/13/2015 11:15 AM      No results found for: \"TSH\"  Lab Results   Component Value Date/Time    PROTHROMBTM 23.4 (H) 03/16/2015 11:40 AM    INR 2.2 12/29/2015 12:00 AM      Aliza reviewed including EKG completed as below      Assessment:     1. Coronary artery disease involving native coronary artery of native heart without angina pectoris  EKG    atorvastatin (LIPITOR) 80 MG tablet      2. Coronary artery disease involving native coronary artery of native heart with angina pectoris with documented spasm (HCC)        3. Essential hypertension  amLODIPine (NORVASC) 5 MG Tab      4. Benign essential HTN  carvedilol (COREG) 6.25 MG Tab    lisinopril-hydrochlorothiazide (PRINZIDE) 20-25 MG per tablet      5. Mixed hyperlipidemia        6. CAD s/p CABG with recovered LVEF  carvedilol (COREG) 6.25 MG Tab    lisinopril-hydrochlorothiazide (PRINZIDE) 20-25 MG per tablet      7. Dyslipidemia  atorvastatin (LIPITOR) 80 MG tablet      8. Nonrheumatic aortic valve insufficiency  EC-ECHOCARDIOGRAM COMPLETE W/O CONT      9. Premature ventricular contractions            Medical Decision Making:  Today's Assessment / Status / Plan:     Doing very well.  Mild aortic insufficiency several years ago due for surveillance echocardiogram.  Medications refilled without change.  Takes his amlodipine at 2.5 mg/day but prefers to cut the larger pill in half.  EKG for his ectopy reveals asymptomatic PVCs of no clinical consequence.  Continue current medical therapy exercise and healthy lifestyle choices.  Follow-up routinely.    () Today's E/M visit is associated with medical care services that serve as the continuing focal point for all needed health care services and/or with medical care services that  are part of ongoing care related to a patient's single, serious condition, or a complex condition: This includes  furnishing services to patients on an ongoing basis " that result in care that is personalized  to the patient. The services result in a comprehensive, longitudinal, and continuous  relationship with the patient and involve delivery of team-based care that is accessible, coordinated with other practitioners and providers, and integrated with the broader health  care landscape.

## 2025-05-08 DIAGNOSIS — I10 BENIGN ESSENTIAL HTN: ICD-10-CM

## 2025-05-08 DIAGNOSIS — E78.5 DYSLIPIDEMIA: ICD-10-CM

## 2025-05-08 DIAGNOSIS — I25.111 CORONARY ARTERY DISEASE INVOLVING NATIVE CORONARY ARTERY OF NATIVE HEART WITH ANGINA PECTORIS WITH DOCUMENTED SPASM (HCC): ICD-10-CM

## 2025-05-08 DIAGNOSIS — I25.10 CORONARY ARTERY DISEASE INVOLVING NATIVE CORONARY ARTERY OF NATIVE HEART WITHOUT ANGINA PECTORIS: ICD-10-CM

## 2025-05-08 RX ORDER — CARVEDILOL 6.25 MG/1
6.25 TABLET ORAL 2 TIMES DAILY
Qty: 180 TABLET | Refills: 3 | Status: SHIPPED | OUTPATIENT
Start: 2025-05-08

## 2025-05-08 RX ORDER — LISINOPRIL AND HYDROCHLOROTHIAZIDE 20; 25 MG/1; MG/1
1 TABLET ORAL DAILY
Qty: 90 TABLET | Refills: 1 | Status: SHIPPED | OUTPATIENT
Start: 2025-05-08

## 2025-05-08 RX ORDER — ATORVASTATIN CALCIUM 80 MG/1
80 TABLET, FILM COATED ORAL DAILY
Qty: 90 TABLET | Refills: 3 | Status: SHIPPED | OUTPATIENT
Start: 2025-05-08